# Patient Record
Sex: MALE | Race: WHITE
[De-identification: names, ages, dates, MRNs, and addresses within clinical notes are randomized per-mention and may not be internally consistent; named-entity substitution may affect disease eponyms.]

---

## 2017-11-14 ENCOUNTER — HOSPITAL ENCOUNTER (OUTPATIENT)
Dept: HOSPITAL 80 - FIMAGING | Age: 77
End: 2017-11-14
Attending: GENERAL ACUTE CARE HOSPITAL
Payer: COMMERCIAL

## 2017-11-14 DIAGNOSIS — M79.605: Primary | ICD-10-CM

## 2017-11-14 DIAGNOSIS — Z96.652: ICD-10-CM

## 2017-11-14 DIAGNOSIS — M79.89: ICD-10-CM

## 2018-04-24 ENCOUNTER — HOSPITAL ENCOUNTER (OUTPATIENT)
Dept: HOSPITAL 80 - FIMAGING | Age: 78
End: 2018-04-24
Attending: FAMILY MEDICINE
Payer: COMMERCIAL

## 2018-04-24 DIAGNOSIS — M81.0: ICD-10-CM

## 2018-04-24 DIAGNOSIS — Z13.820: Primary | ICD-10-CM

## 2018-06-07 ENCOUNTER — HOSPITAL ENCOUNTER (OUTPATIENT)
Dept: HOSPITAL 80 - FIMAGING | Age: 78
End: 2018-06-07
Attending: FAMILY MEDICINE
Payer: COMMERCIAL

## 2018-06-07 DIAGNOSIS — M51.26: ICD-10-CM

## 2018-06-07 DIAGNOSIS — M48.061: Primary | ICD-10-CM

## 2018-10-02 ENCOUNTER — HOSPITAL ENCOUNTER (OUTPATIENT)
Dept: HOSPITAL 80 - FCATH | Age: 78
Discharge: HOME | End: 2018-10-02
Attending: INTERNAL MEDICINE
Payer: COMMERCIAL

## 2018-10-02 DIAGNOSIS — I77.810: ICD-10-CM

## 2018-10-02 DIAGNOSIS — Z96.652: ICD-10-CM

## 2018-10-02 DIAGNOSIS — Z79.01: ICD-10-CM

## 2018-10-02 DIAGNOSIS — I44.2: ICD-10-CM

## 2018-10-02 DIAGNOSIS — G47.33: ICD-10-CM

## 2018-10-02 DIAGNOSIS — I25.10: Primary | ICD-10-CM

## 2018-10-02 DIAGNOSIS — R60.0: ICD-10-CM

## 2018-10-02 DIAGNOSIS — E78.00: ICD-10-CM

## 2018-10-02 DIAGNOSIS — E66.01: ICD-10-CM

## 2018-10-02 DIAGNOSIS — Z95.5: ICD-10-CM

## 2018-10-02 DIAGNOSIS — I10: ICD-10-CM

## 2018-10-02 DIAGNOSIS — I48.0: ICD-10-CM

## 2018-10-02 DIAGNOSIS — Z95.0: ICD-10-CM

## 2018-10-02 LAB
INR PPP: 1.03 (ref 0.83–1.16)
PLATELET # BLD: 194 10^3/UL (ref 150–400)
PROTHROMBIN TIME: 13.7 SEC (ref 12–15)

## 2018-10-02 PROCEDURE — C1887 CATHETER, GUIDING: HCPCS

## 2018-10-02 PROCEDURE — 93571 IV DOP VEL&/PRESS C FLO 1ST: CPT

## 2018-10-02 PROCEDURE — 93005 ELECTROCARDIOGRAM TRACING: CPT

## 2018-10-02 PROCEDURE — 4A023N7 MEASUREMENT OF CARDIAC SAMPLING AND PRESSURE, LEFT HEART, PERCUTANEOUS APPROACH: ICD-10-PCS | Performed by: INTERNAL MEDICINE

## 2018-10-02 PROCEDURE — B2151ZZ FLUOROSCOPY OF LEFT HEART USING LOW OSMOLAR CONTRAST: ICD-10-PCS | Performed by: INTERNAL MEDICINE

## 2018-10-02 PROCEDURE — 93458 L HRT ARTERY/VENTRICLE ANGIO: CPT

## 2018-10-02 PROCEDURE — C1769 GUIDE WIRE: HCPCS

## 2018-10-02 PROCEDURE — B2111ZZ FLUOROSCOPY OF MULTIPLE CORONARY ARTERIES USING LOW OSMOLAR CONTRAST: ICD-10-PCS | Performed by: INTERNAL MEDICINE

## 2018-10-02 PROCEDURE — 4A033BC MEASUREMENT OF ARTERIAL PRESSURE, CORONARY, PERCUTANEOUS APPROACH: ICD-10-PCS | Performed by: INTERNAL MEDICINE

## 2018-10-02 NOTE — CPEKG
Test Reason : OPEN

Blood Pressure : ***/*** mmHG

Vent. Rate : 075 BPM     Atrial Rate : 152 BPM

   P-R Int : 086 ms          QRS Dur : 133 ms

    QT Int : 586 ms       P-R-T Axes : 076 086 -83 degrees

   QTc Int : 655 ms

 

Afib/flut and V-paced complexes

PVCs

 

Confirmed by Emelina Saenz ([id number]) on 10/2/2018 4:59:45 PM

 

Referred By:             Confirmed By:Emelina Saenz

## 2018-10-02 NOTE — CPIP
DATE OF PROCEDURE:  10/02/2018



PROCEDURE:  

1.  Coronary angiography.

2.  Fractional flow reserve of the circumflex coronary artery.



INDICATION:  

1.  Abnormal stress test.

2.  Intermediate grade lesion involving the proximal circumflex coronary artery.



ACCESS AND CORONARY ANGIOGRAPHY:  Access and coronary angiography were performed by Dr. Enrique Alberto.
  Coronary angiography was notable for an intermediate grade lesion involving the proximal circumflex
 coronary artery.  I was consulted to perform FFR on this lesion to determine clinical significance.



FFR OF THE CIRCUMFLEX CORONARY ARTERY:  A 6-Lao EBU 4.0 catheter was advanced to the left main cor
onary artery and images obtained.  Angiography confirmed the presence of an intermediate grade lesion
 involving the proximal circumflex coronary artery.  The FFR wire was placed in the distal vessel and
 position verified by angiography.  IV adenosine was infused for a total of 2 minutes and 30 seconds 
and FFR was obtained.  The FFR was 0.97 indicating no flow limitation.  Given limited variability fro
m baseline FFR, it was decided to perform IC adenosine infusion as well to see if the FFR would lama
e.  The FFR following IV adenosine infusion was 0.97 as well indicating no flow limitation.



COMPLICATIONS:  None.



CONCLUSION:  Intermediate grade lesion involving the proximal circumflex coronary artery with no evid
ence of flow limitation by fractional flow reserve.



PLAN:  For medical management.





Job #:  [id number]

## 2018-10-03 ENCOUNTER — HOSPITAL ENCOUNTER (OUTPATIENT)
Dept: HOSPITAL 80 - F3N | Age: 78
Setting detail: OBSERVATION
LOS: 1 days | Discharge: HOME HEALTH | End: 2018-10-04
Attending: NEUROLOGICAL SURGERY | Admitting: NEUROLOGICAL SURGERY
Payer: COMMERCIAL

## 2018-10-03 DIAGNOSIS — E78.00: ICD-10-CM

## 2018-10-03 DIAGNOSIS — Z79.01: ICD-10-CM

## 2018-10-03 DIAGNOSIS — G96.19: ICD-10-CM

## 2018-10-03 DIAGNOSIS — M48.061: Primary | ICD-10-CM

## 2018-10-03 DIAGNOSIS — Z95.0: ICD-10-CM

## 2018-10-03 DIAGNOSIS — I10: ICD-10-CM

## 2018-10-03 DIAGNOSIS — Z96.652: ICD-10-CM

## 2018-10-03 DIAGNOSIS — Z95.5: ICD-10-CM

## 2018-10-03 DIAGNOSIS — M51.36: ICD-10-CM

## 2018-10-03 DIAGNOSIS — M46.1: ICD-10-CM

## 2018-10-03 DIAGNOSIS — I25.10: ICD-10-CM

## 2018-10-03 PROCEDURE — 00BY0ZZ EXCISION OF LUMBAR SPINAL CORD, OPEN APPROACH: ICD-10-PCS | Performed by: NEUROLOGICAL SURGERY

## 2018-10-03 PROCEDURE — 63047 LAM FACETEC & FORAMOT LUMBAR: CPT

## 2018-10-03 PROCEDURE — 63267 EXCISE INTRSPINL LESION LMBR: CPT

## 2018-10-03 PROCEDURE — 4A11X4G MONITORING OF PERIPHERAL NERVOUS ELECTRICAL ACTIVITY, INTRAOPERATIVE, EXTERNAL APPROACH: ICD-10-PCS | Performed by: NEUROLOGICAL SURGERY

## 2018-10-03 PROCEDURE — 00NY0ZZ RELEASE LUMBAR SPINAL CORD, OPEN APPROACH: ICD-10-PCS | Performed by: NEUROLOGICAL SURGERY

## 2018-10-03 PROCEDURE — 97166 OT EVAL MOD COMPLEX 45 MIN: CPT

## 2018-10-03 PROCEDURE — 76001: CPT

## 2018-10-03 PROCEDURE — G0378 HOSPITAL OBSERVATION PER HR: HCPCS

## 2018-10-03 PROCEDURE — 97535 SELF CARE MNGMENT TRAINING: CPT

## 2018-10-03 PROCEDURE — 97161 PT EVAL LOW COMPLEX 20 MIN: CPT

## 2018-10-03 RX ADMIN — METHOCARBAMOL PRN MG: 750 TABLET ORAL at 15:29

## 2018-10-03 RX ADMIN — GABAPENTIN SCH MG: 300 CAPSULE ORAL at 22:48

## 2018-10-03 RX ADMIN — DOCUSATE SODIUM AND SENNOSIDES SCH: 50; 8.6 TABLET ORAL at 22:49

## 2018-10-03 RX ADMIN — SOTALOL HYDROCHLORIDE SCH MG: 80 TABLET ORAL at 22:48

## 2018-10-03 RX ADMIN — FUROSEMIDE SCH: 20 TABLET ORAL at 12:48

## 2018-10-03 RX ADMIN — DOCUSATE SODIUM AND SENNOSIDES SCH TAB: 50; 8.6 TABLET ORAL at 23:02

## 2018-10-03 RX ADMIN — FUROSEMIDE SCH MG: 20 TABLET ORAL at 15:03

## 2018-10-03 RX ADMIN — ACETAMINOPHEN SCH MG: 500 TABLET ORAL at 22:49

## 2018-10-03 RX ADMIN — Medication SCH: at 12:47

## 2018-10-03 RX ADMIN — DOCUSATE SODIUM AND SENNOSIDES SCH: 50; 8.6 TABLET ORAL at 12:48

## 2018-10-03 RX ADMIN — FAMOTIDINE SCH MG: 20 TABLET, FILM COATED ORAL at 22:50

## 2018-10-03 RX ADMIN — GABAPENTIN SCH MG: 300 CAPSULE ORAL at 15:02

## 2018-10-03 RX ADMIN — Medication SCH MLS: at 22:52

## 2018-10-03 RX ADMIN — FAMOTIDINE SCH: 20 TABLET, FILM COATED ORAL at 12:48

## 2018-10-03 RX ADMIN — FINASTERIDE SCH: 5 TABLET, FILM COATED ORAL at 12:48

## 2018-10-03 RX ADMIN — ACETAMINOPHEN SCH MG: 500 TABLET ORAL at 15:03

## 2018-10-03 RX ADMIN — SOTALOL HYDROCHLORIDE SCH: 80 TABLET ORAL at 12:48

## 2018-10-03 RX ADMIN — LISINOPRIL SCH MG: 10 TABLET ORAL at 12:45

## 2018-10-03 RX ADMIN — Medication SCH MLS: at 15:15

## 2018-10-03 NOTE — SOAPPROG
SOAP Progress Note


Assessment/Plan: 


(late entry due to access issue of [employer])





Post Op Visit





S: Awake and alert, NAD.  Pt with expected lower back pain


O: AFVSS/PERRLA/EOMI


no droop


CN 2-12 grossly intact


+lt touch


5/5 BUE/BLE =


CDI





A/P: 77 yo male that is s/p L4/5 laminectomy


-s/p laminectomy-pt with some expected lower back pain, LE feel "ok"


-orders in place


-call with any questions or concerns


-pt understands and agrees


-PT/OT





Pt seen in PACU





10/03/18 11:10





Objective: 





 Vital Signs











Temp Pulse Resp BP Pulse Ox


 


 36.1 C   72   9 L  132/74 H  96 


 


 10/03/18 10:15  10/03/18 10:15  10/03/18 10:40  10/03/18 10:40  10/03/18 10:40














ICD10 Worksheet


Patient Problems: 


 Problems











Problem Status Onset


 


S/P lumbar laminectomy Acute  














- ICD10 Problem Qualifiers


(1) S/P lumbar laminectomy

## 2018-10-03 NOTE — PDANEPAE
ANE History of Present Illness





L4-5 LAMI





ANE Past Medical History





- Cardiovascular History


Hx Hypertension: Yes


Hx Arrhythmias: Yes


Hx Chest Pain: No


Hx Coronary Artery / Peripheral Vascular Disease: Yes


Hx CHF / Valvular Disease: No


Hx Palpitations: No


Cardiovascular History Comment: cad with stent.  afib.  complete heart block 

with pacer.  hyperlipidemia





- Pulmonary History


Hx COPD: No


Hx Asthma/Reactive Airway Disease: No


Hx Recent Upper Respiratory Infection: No


Hx Oxygen in Use at Home: No


Hx Sleep Apnea: Yes


Sleep Apnea Screening Result - Last Documented: Positive


Pulmonary History Comment: mariama positive- doesn't use cpap and hasn't used in 

over a year





- Neurologic History


Hx Cerebrovascular Accident: No


Hx Seizures: No


Hx Dementia: No





- Endocrine History


Hx Diabetes: No





- Renal History


Hx Renal Disorders: No





- Liver History


Hx Hepatic Disorders: No





- Neurological & Psychiatric Hx


Hx Neurological and Psychiatric Disorders: Yes


Neurological / Psychiatric History Comment: anxiety





- Cancer History


Hx Cancer: No





- Congenital Disorder History


Hx Congenital Disorders: No





- GI History


Hx Gastrointestinal Disorders: Yes


Gastrointestinal History Comment: hx of bowel obstruction with resection





- Other Health History


Other Health History: wears glasses.  current eyelid infection treating with abx





- Chronic Pain History


Chronic Pain: Yes (back pain and legs)





- Surgical History


Prior Surgeries: pacemaker placed 04/07/14.  left TKA and revision.  broken 

shoulder repair.  bowel resection from obstruction.  hematoma evacuation to 

left thigh.  broken pelvis repair from fall off ladder





ANE Review of Systems


Review of Systems: 








- Exercise capacity


METS (RN): 3 METS





- Pacemaker


Pacemaker Type: Permanent Pacer/Defib


Pacemaker : [employer]


Pacemaker Model: Advantio


Pacemaker Mode: DDDR


Pacemaker Set Rate: 60


Date Pacemaker Last Checked: 03/19/18





ANE Patient History





- Allergies


Allergies/Adverse Reactions: 








No Known Allergies Allergy (Verified 09/28/18 19:00)


 








- Home Medications


Home Medications: 








Apixaban [Eliquis] 5 mg PO BID 09/24/18 [Last Taken 09/29/18]


Atorvastatin Calcium [Lipitor 40 mg (*)] 40 mg PO HS 09/24/18 [Last Taken 10/02/

18 07:30]


Calcium Carb W/Vit D [Calcium Carb W/Vit D 500/200 (*)] 500 mg PO DAILY 09/24/ 18 [Last Taken 10/01/18 07:00]


Escitalopram Oxalate [Lexapro] 5 mg PO DAILY 09/24/18 [Last Taken 10/02/18 19:30

]


Finasteride [Proscar 5 MG (*)] 5 mg PO DAILY 09/24/18 [Last Taken 10/02/18 07:00

]


Fosinopril Sodium 10 mg PO DAILY 09/24/18 [Last Taken 10/02/18 07:30]


Furosemide [Lasix 20 MG (*)] 20 mg PO BID@09,15 09/24/18 [Last Taken 10/02/18 16

:00]


Herbals/Supplements -Info Only 1 ea PO DAILY 09/24/18 [Last Taken 09/29/18]


Sotalol HCl [Betapace 80 MG (*)] 80 mg PO BID 09/24/18 [Last Taken 10/02/18 07:

30]


oxyCODONE HCL/ACETAMINOPHEN [Percocet  mg Tablet] 1 each PO Q6HRS PRN 09/ 24/18 [Last Taken 10/03/18 04:15]


traMADol [Ultram 50 mg (*)] 50 mg PO Q4 PRN 09/24/18 [Last Taken 10/02/18 21:00]








- NPO status


NPO Since - Liquids (Date): 10/02/18


NPO Since - Liquids (Time): 19:00


NPO Since - Solids (Date): 10/02/18


NPO Since - Solids (Time): 19:00





- Smoking Hx


Smoking Status: Never smoked





- Family Anes Hx


Family Hx Anesthesia Complications: none





ANE Labs/Vital Signs





- Vital Signs


Blood Pressure: 106/54


Heart Rate: 76


Respiratory Rate: 16


O2 Sat (%): 90


Height: 172.7 cm


Weight: 107.3 kg





ANE Physical Exam





- Airway


Neck exam: FROM


Mallampati Score: Class 3


Mouth exam: normal dental/mouth exam





- Pulmonary


Pulmonary: clear to auscultation





- Cardiovascular


Cardiovascular: regular rate and rhythym





- ASA Status


ASA Status: III





ANE Anesthesia Plan


Anesthesia Plan: general endotracheal anesthesia

## 2018-10-03 NOTE — POSTOPPROG
Post Op Note


Date of Operation: 10/03/18


Surgeon: JAMMIE Scott


Assistant: WILEY Rider


Anesthesia: GET(General Endotracheal), Local (Specify)


Pre-op Diagnosis: lumbar stenosis L4/5


Post-op Diagnosis: lumbar stenosis L4/5


Indication: LLE pain/LBP


Procedure: L4/5 laminectomy


Findings: none


Inf/Abcess present in the surg proc area at time of surgery?: No


Depth: Deep Incisional (Fascial)


EBL: 


Complications: 





none

## 2018-10-03 NOTE — GOP
DATE OF OPERATION:  10/03/2018



SURGEON:  JADE Scott MD



NEUROSURGEON:  Reagan Scott M.D.



ASSISTANT:  Kenney Rider PA-C.



PREOPERATIVE DIAGNOSIS:  Severe lumbar stenosis L4-5, probable mass in the spinal canal at L4-5.



POSTOPERATIVE DIAGNOSIS:  

1.  Severe stenosis L4-5 with a left-sided synovial cyst, L4-5.



PROCEDURE PERFORMED:  Bilateral laminectomy of L4-5 with a resection of an intraspinal left-sided syn
ovial cyst ([id number]), microscope.



FINDINGS:  





ESTIMATED BLOOD LOSS:  25 cc.



INDICATIONS:  Mr. Melendez is an elderly gentleman with terrible bilateral leg pain, and a CT myelogr
am demonstrated what appeared to be severe stenosis at L4-5 with a large left-sided intraspinal mass 
at that level.  It was unclear, however, the quality of myelogram was somewhat poor, and I was not re
ally sure if he had a mass there or not, but he did have severe stenosis at L4-5.  There is also sten
osis at L2-3 and L3-4, but I felt the symptoms almost certainly were coming from the single level at 
L4-5.  We offered him a decompression.  He understood it may or may not alleviate the pain.  The risk
 of nerve injury and spinal fluid leak was discussed.  He did want to proceed.



DESCRIPTION OF PROCEDURE:  Patient was taken to the operating room, placed in a supine position.  Gen
eral anesthesia was begun.  He was flipped prone onto the [place] frame.  Care was taken to pad all po
ints of contact.  His back was sterilely prepped and draped in the usual fashion.  A localizing x-ray
 was taken.  We made a midline incision about 2.5 inches in length.  The subcutaneous tissue was diss
ected using Bovie cautery down to the fascia, and subperiosteal dissection was made down the L4-5 de la cruz
paul.  A self-retaining retractor was placed.  It required the deepest retractor system that we had av
ailable in the [place].  We confirmed our location at L4-5, and removed most of the L4 spinous pr
ocess.  We then drilled bilateral laminas at L4, and the rostral lip of L5 was removed.  We opened li
gamentum flavum, and the ligamentum flavum was completely stuck to the dura everywhere outside of the
 midline.  We stripped the ligamentum flavum at the midline, and then, because we could not really de
velop a plane between the ligamentum flavum and the dura, I then went to the left-hand side where I e
xpected to find a mass in the spinal canal, and I simply started opening up the left L4-5 facet and u
ndercutting the IAP of L4 on the left-hand side, and, as it went rostrally toward the L4 pedicle, I e
ncountered a large amount of synovial proteinaceous fluid, and this allowed me to enter the spinal ca
nal, and I was inside a huge synovial cyst.  It was about the size of a human thumb.  It was a giant 
cyst measuring about 2 cm occupying the entire left rimma-spinal canal, and we completely removed the 
contents of the cyst and then began laterally stripping the cyst wall out of the neural foramen down 
toward the L5 pedicle.  The medial cyst wall was stuck to the dura, but we were able to carefully and
 slowly dissect it free from the dura completely, and completely resected the cyst.  We did not send 
it to pathology.  It came out piecemeal and was simply a synovial cyst.  On the right-hand side, the 
dura was completely attached to the ligamentum flavum as well.  We went rostrally up toward the L4 pe
dicle and developed a plane into the foramen to the exiting L4 root, and then began to work our way i
nferiorly, peeling the ligamentum flavum away from the dura, and we were able to do so all the way do
wn to the L5 pedicle.  We got a great right-sided decompression.  We irrigated with antibiotic saline
 solution.  We did not shoot a final x-ray as we already knew we were at the L4-5 level, and then res
ected a large mass in the spinal canal consistent with what the myelogram demonstrated.  We then clos
ed the incision in multiple layers using Vicryl sutures.  A running PDS was placed in the skin itself
.  The patient was reversed from anesthesia, extubated, and transferred to recovery room in stable co
ndition.  There were no complications.



COMPLICATIONS:  None.





Job #:  [id number]

## 2018-10-03 NOTE — PDDXCAT
Diagnostic Cath Note





- .


Date: 10/03/18


: White


Indication: other (Recent admission with chest discomfort, abnormal stress 

myocardial perfusion imaging with intermediate risk features.)





- Procedure


Access: right wrist





- Materials


Left Heart Cath size: 5F


Left Heart Cath materials: JL4.0, JR4.0





- Findings-Left Heart Catheterization


LM: Moderate caliber vessel.  Eccentric distal calcium shelf resulting in a 20% 

distal stenosis.


LAD: Moderate caliber vessel.  Single very proximal diagonal branch with 

multiple smaller surgically insignificant diagonal branches.  Diffusely 

diseased with luminal irregularities up to 20%.  Stents are visualized in the 

proximal segment which are widely patent.


LCX: Moderate caliber in the proximal segment.  Becoming diminutive after the 

origin of a single obtuse marginal branch.  There is a 50% proximal lesion.  

The remainder of the vessel is diffusely diseased with luminal irregularities 

up to 20%.


RCA: Moderate caliber vessel.  Dominant with an obvious PDA and 2 

posterolateral branches.  Diffusely diseased up to 30% in the proximal and mid 

segment.  The distal vessel is difficult to visualize between the 1st and 2nd 

posterolateral branch however there is the appearance of a high-grade lesion in 

this segment.


LVEF: 55-60%.


Wall motion: Abnormal wall motion likely related to the patient's paced rhythm.


Complications: None


Estimated blood loss: <50ml


Closure method: TR Band


Assessment: 1.  Diffuse epicardial coronary disease without stenoses in the 

major vessels.  There is likely a high-grade lesion in the distal portion of 

the right coronary artery between the 1st and 2nd posterolateral branches.  2. 

Previously placed stents in the proximal LAD are widely patent.  3. Preserved 

left ventricular systolic function with abnormal contractility likely related 

to the patient's underlying paced rhythm.  4. Based on the results of this study

, the patient is at low risk for perioperative cardiovascular events in the 

setting of the planned back surgery.


Plan: 





Maximal medical therapy for secondary prevention.


Intervention: 


There were no interventions performed.  The patient did have fractional flow 

reserve testing of the circumflex which resulted in an FFR of 1 indicating 

nonobstructive disease in this vessel.

## 2018-10-04 VITALS — DIASTOLIC BLOOD PRESSURE: 64 MMHG | SYSTOLIC BLOOD PRESSURE: 114 MMHG

## 2018-10-04 RX ADMIN — GABAPENTIN SCH MG: 300 CAPSULE ORAL at 06:28

## 2018-10-04 RX ADMIN — FUROSEMIDE SCH MG: 20 TABLET ORAL at 08:48

## 2018-10-04 RX ADMIN — METHOCARBAMOL PRN MG: 750 TABLET ORAL at 03:14

## 2018-10-04 RX ADMIN — FINASTERIDE SCH MG: 5 TABLET, FILM COATED ORAL at 08:48

## 2018-10-04 RX ADMIN — SOTALOL HYDROCHLORIDE SCH MG: 80 TABLET ORAL at 08:47

## 2018-10-04 RX ADMIN — GABAPENTIN SCH MG: 300 CAPSULE ORAL at 14:19

## 2018-10-04 RX ADMIN — FAMOTIDINE SCH MG: 20 TABLET, FILM COATED ORAL at 08:47

## 2018-10-04 RX ADMIN — Medication SCH MG: at 08:49

## 2018-10-04 RX ADMIN — DOCUSATE SODIUM AND SENNOSIDES SCH TAB: 50; 8.6 TABLET ORAL at 08:46

## 2018-10-04 RX ADMIN — METHOCARBAMOL PRN MG: 750 TABLET ORAL at 07:30

## 2018-10-04 RX ADMIN — ACETAMINOPHEN SCH MG: 500 TABLET ORAL at 14:19

## 2018-10-04 RX ADMIN — LISINOPRIL SCH MG: 10 TABLET ORAL at 08:48

## 2018-10-04 RX ADMIN — ACETAMINOPHEN SCH: 500 TABLET ORAL at 06:37

## 2018-10-04 NOTE — PDIAF
- Diagnosis


Diagnosis: S/P L4-5 laminectomy 


Code Status: Full Code





- Medication Management


Discharge Medications: 


 Medications to Continue on Transfer





Atorvastatin Calcium [Lipitor 40 mg (*)] 40 mg PO HS 09/24/18 [Last Taken 10/02/

18 07:30]


Calcium Carb W/Vit D [Calcium Carb W/Vit D 500/200 (*)] 500 mg PO DAILY 09/24/ 18 [Last Taken 10/01/18 07:00]


Escitalopram Oxalate [Lexapro 10 MG] 5 mg PO DAILY 09/24/18 [Last Taken 10/02/

18 19:30]


Finasteride [Proscar 5 MG (*)] 5 mg PO DAILY 09/24/18 [Last Taken 10/02/18 07:00

]


Fosinopril Sodium 10 mg PO DAILY 09/24/18 [Last Taken 10/02/18 07:30]


Furosemide [Lasix 20 MG (*)] 20 mg PO BID@09,15 09/24/18 [Last Taken 10/02/18 16

:00]


Herbals/Supplements -Info Only 1 ea PO DAILY 09/24/18 [Last Taken 09/29/18]


Sotalol HCl [Betapace 80 MG (*)] 80 mg PO BID 09/24/18 [Last Taken 10/02/18 07:

30]


traMADol [Ultram 50 mg (*)] 50 mg PO Q4 PRN 09/24/18 [Last Taken 10/02/18 21:00]


Apixaban [Eliquis] 5 mg PO BID #1 10/04/18 [Last Taken 09/29/18]


Methocarbamol [Robaxin 750 mg (*)] 750 mg PO QID PRN #40 tab 10/04/18 [Last 

Taken Unknown]


oxyCODONE IR [Oxycodone Ir (*)] 5 - 10 mg PO Q4HRS PRN #50 tab 10/04/18 [Last 

Taken Unknown]








Discharge Medications: Refer to the Discharge Home Medication list for PRN 

reason.


PICC Care - Routine: N/A





- Orders


Services needed: Home Care, Physical Therapy, Occupational Therapy


Home Care Face to Face: I certify that this patient was under my care and that 

I had the required face-to-face encounter meeting the encounter requirements on 

the discharge day.  My findings support the fact that the patient is homebound 

as defined in


Home Care Face to Face Continued: CMS Chapter 7 Medicare Benefits Manual 30.1.1

, The condition of the patient is such that there exists a normal inability to 

leave home and consequently, leaving home would require a considerable and 

taxing effort.


Diet Recommendation: no restrictions on diet


Diet Texture: Regular Texture Diet


Sutures/Staples Site: Ok to remove dressing on Saturday 10/6.  Leave steri 

strips in place.  Ok to shower Saturday 10/6


Activity/Weight Bearing Restrictions: Do not lift greater than 10 pounds.  No 

bending or twisting at the waist


Additional Instructions: 


No bending or twisting


Do not lift greater than 10 pounds


Ok to remove dressing on Saturday 10/6-leave steri strips in place


Ok to shower on Saturday 10/6


Do not submerge incision 





- Follow Up Care


Current Providers and Referrals: 


Yury Mas MD [Primary Care Provider] - 


JAMMIE Scott MD [Medical Doctor] - follow up in 2 weeks

## 2018-10-04 NOTE — ASMTCMCOM
CM Note

 

CM Note                       

Notes:

Pt s/p planned surgery for spinal stenosis. Pt with comorbidities including HTN, CAD. Pt resides 

with spouse. PT/OT evals pending. CM to follow. 

 

Date Signed:  10/04/2018 10:43 AM

Electronically Signed By:JAISON Lee

## 2018-10-04 NOTE — ASMTDCNOTE
Case Management Discharge

 

Discharge Order Complete?     Answers:  Yes                                   

Patient to Obtain             Answers:  via Family                            

Medications                                                                   

Transportation Arranged       Answers:  Family/Friends                        

Agency/Facility Transfer      Answers:  Yes                                   

Report Printed & Faxed to                                                     

Receiving Agency                                                              

Family Notified               Answers:  Yes                           Notes:  wife in room

Discharge Comments            

Notes:

Spoke with pt and wife in the room. PT/OT recommending PT/OT homecare. Pt and wife agreeable. [facility] 

accepted and will start tomorrow. No further CM needs noted at this time. 

 

Date Signed:  10/04/2018 04:02 PM

Electronically Signed By:Yanique Pham

## 2018-10-04 NOTE — NEUSURGPN
Date of Surgery: 10/03/18


Post Op Day: 1


Assessment/Plan: 


Assessment:  79 yo male that is s/p L4/5 laminectomy POD#1





Plan:


-patient reports improvement in pre operative leg pain, expected incisional 

pain 


-discharge home today 


-PT/OT


Patient discussed with Dr Scott


Please call with questions/concerns 





Subjective: 


leg pain improved, sitting in chair 


Objective: 


AxO x3


COREAS x4


5/5 BLE


Sensation intact light touch BLE


Dressing/incision CDI 


Neuro Check Frequency: per routine 


Urinary Catheter in Place: No





- Physician


Discussed Patient with : Tyler


Patient Seen by : Tyler





Neurosurgery Physical Exam





- Vitals, I&O, Labs





 I and O











 10/03/18 10/04/18 10/05/18





 05:59 05:59 05:59


 


Intake Total  1000 


 


Output Total  1100 550


 


Balance  -100 -550


 


Weight  107.3 kg 


 


Intake:   


 


  Oral (ml)  1000 


 


Output:   


 


  Urine (ml)  1100 550


 


    Urinal  1100 550


 


Other:   


 


  Number of Voids   


 


    Incontinence  1 


 


    Urinal  1 1








 Vital Signs











Temp Pulse Resp BP Pulse Ox


 


 36.8 C   68   16   114/64   94 


 


 10/04/18 11:20  10/04/18 11:20  10/04/18 11:20  10/04/18 11:20  10/04/18 11:20














ICD10 Worksheet


Patient Problems: 


 Problems











Problem Status Onset


 


S/P lumbar laminectomy Acute

## 2018-10-04 NOTE — ASDISCHSUM
----------------------------------------------

Discharge Information

----------------------------------------------

Plan Status:Home with Home Health                    Medically Cleared to Leave:10/04/2018

Discharge Date:10/04/2018 02:47 PM                    D/C Disposition:Home Health Service

[facility] D/C Disposition:HHSNOTBCH                        Projected Discharge Date:10/04/2018 11:00 AM

Transportation at D/C:Family                         Discharge Delay Reason:

Follow-Up Date:10/04/2018 11:00 AM                   Discharge Slot:

Final Diagnosis:laminectomy

----------------------------------------------

Placement Information

----------------------------------------------

Referral Type:*Home Health Care Services             Referral ID:[id number]

Provider Name:[facility]

Address 1:[address] [facility] [address]                  Phone [phone]

Address 2:                                           Fax [phone]

City:[place]                                         Selection Factors:

State:[place]

 

----------------------------------------------

Patient Contact Information

----------------------------------------------

Contact Name:GOMEZ                           Relationship:Wife

Address:[address]                              Home [phone]

                                                     Work Phone:

[facility]:[place]                                        Alternate Phone:

[facility]/Zip Code:[place] [address]                              Email:

----------------------------------------------

Financial Information

----------------------------------------------

Financial Class:Medicare Advantage Plans

Primary Plan Desc:[employer]         Primary Plan Number:[id number]

Secondary Plan Desc:                                 Secondary Plan Number:

 

 

----------------------------------------------

Assessment Information

----------------------------------------------

----------------------------------------------

LACE

----------------------------------------------

LACE

 

Length of stay for            Answers:  1 day                                 

current admission                                                             

Acuity / Level of             Answers:  No                                    

Care: Did the patient                                                         

have an inpatient                                                             

admission?                                                                    

Comorbidities - select        Answers:  Coronary Artery Disease               

all that apply                                                                

                                        Opioid dependence                     

                                        / Chronic pain                        

                                        Other                         Notes:  HTN

# of Emergency department     Answers:  0                                     

visits in the last 6                                                          

months                                                                        

Social determinants           Answers:  Mental health diagnosis               

                                        (anxiety, depression, pers            

                                        onality disorders, etc.)              

Score: 11

 

Date Signed:  10/04/2018 04:01 PM

Electronically Signed By:Yanique Pham

 

 

----------------------------------------------

[facility] CM Progress Note

----------------------------------------------

CM Note

 

CM Note                       

Notes:

Pt s/p planned surgery for spinal stenosis. Pt with comorbidities including HTN, CAD. Pt resides 

with spouse. PT/OT monet pending. CM to follow. 

 

Date Signed:  10/04/2018 10:43 AM

Electronically Signed By:JAISON Lee

 

 

----------------------------------------------

Case Management Discharge Plan Note

----------------------------------------------

Case Management Discharge

 

Discharge Order Complete?     Answers:  Yes                                   

Patient to Obtain             Answers:  via Family                            

Medications                                                                   

Transportation Arranged       Answers:  Family/Friends                        

Agency/Facility Transfer      Answers:  Yes                                   

Report Printed & Faxed to                                                     

Receiving Agency                                                              

Family Notified               Answers:  Yes                           Notes:  wife in room

Discharge Comments            

Notes:

Spoke with pt and wife in the room. PT/OT recommending PT/OT homecare. Pt and wife agreeable. [facility] 

accepted and will start tomorrow. No further CM needs noted at this time. 

 

Date Signed:  10/04/2018 04:02 PM

Electronically Signed By:Yanique Pham

 

 

----------------------------------------------

Intervention Information

----------------------------------------------

## 2018-10-04 NOTE — ASMTLACE
LACE

 

Length of stay for            Answers:  1 day                                 

current admission                                                             

Acuity / Level of             Answers:  No                                    

Care: Did the patient                                                         

have an inpatient                                                             

admission?                                                                    

Comorbidities - select        Answers:  Coronary Artery Disease               

all that apply                                                                

                                        Opioid dependence                     

                                        / Chronic pain                        

                                        Other                         Notes:  HTN

# of Emergency department     Answers:  0                                     

visits in the last 6                                                          

months                                                                        

Social determinants           Answers:  Mental health diagnosis               

                                        (anxiety, depression, pers            

                                        onality disorders, etc.)              

Score: 11

 

Date Signed:  10/04/2018 04:01 PM

Electronically Signed By:Yanique Pham

## 2018-10-05 ENCOUNTER — HOSPITAL ENCOUNTER (INPATIENT)
Dept: HOSPITAL 80 - FED | Age: 78
LOS: 3 days | Discharge: NURSING, REHAB OR LONG-TERM CARE | DRG: 552 | End: 2018-10-08
Attending: NEUROLOGICAL SURGERY | Admitting: NEUROLOGICAL SURGERY
Payer: COMMERCIAL

## 2018-10-05 DIAGNOSIS — I50.9: ICD-10-CM

## 2018-10-05 DIAGNOSIS — M54.9: Primary | ICD-10-CM

## 2018-10-05 DIAGNOSIS — Z95.5: ICD-10-CM

## 2018-10-05 DIAGNOSIS — Z79.01: ICD-10-CM

## 2018-10-05 DIAGNOSIS — Z95.1: ICD-10-CM

## 2018-10-05 DIAGNOSIS — Z96.659: ICD-10-CM

## 2018-10-05 DIAGNOSIS — R41.0: ICD-10-CM

## 2018-10-05 DIAGNOSIS — E78.5: ICD-10-CM

## 2018-10-05 DIAGNOSIS — I25.10: ICD-10-CM

## 2018-10-05 DIAGNOSIS — I48.91: ICD-10-CM

## 2018-10-05 DIAGNOSIS — I11.0: ICD-10-CM

## 2018-10-05 DIAGNOSIS — R53.1: ICD-10-CM

## 2018-10-05 LAB
INR PPP: 1.02 (ref 0.83–1.16)
PLATELET # BLD: 190 10^3/UL (ref 150–400)
PROTHROMBIN TIME: 13.6 SEC (ref 12–15)

## 2018-10-05 RX ADMIN — FAMOTIDINE SCH MG: 20 TABLET, FILM COATED ORAL at 20:50

## 2018-10-05 RX ADMIN — POTASSIUM CHLORIDE SCH MEQ: 1500 TABLET, EXTENDED RELEASE ORAL at 20:48

## 2018-10-05 RX ADMIN — DOCUSATE SODIUM AND SENNOSIDES SCH TAB: 50; 8.6 TABLET ORAL at 20:50

## 2018-10-05 RX ADMIN — SOTALOL HYDROCHLORIDE SCH MG: 80 TABLET ORAL at 20:49

## 2018-10-05 RX ADMIN — ACETAMINOPHEN SCH MG: 500 TABLET ORAL at 20:50

## 2018-10-05 RX ADMIN — ATORVASTATIN CALCIUM SCH MG: 40 TABLET, FILM COATED ORAL at 20:49

## 2018-10-05 RX ADMIN — APIXABAN SCH MG: 5 TABLET, FILM COATED ORAL at 20:48

## 2018-10-05 NOTE — PDGENHP
History and Physical


History and Physical: 





HOSPITAL MEDICINE CONSULT NOTE





CC:  We are asked by the Neurosurgery service to assess and assist in the care 

of this patient who has some change in mentation and weakness after recent 

spine surgery





HISTORY:  This patient had a surgery here on October 3rd with Dr. Reagan Scott 

for spinal stenosis with neuropathic symptoms and was discharged on October 4th.  New parent he was doing well the discharge in was ambulating with a 

walker and cane.  There had been no signs of complications.  On my review of 

the chart he had no temperatures higher than 37.4, did not have significant 

hypoxia and did not have any concerning vital sign changes.  Today the patient 

was working with physical therapist and was having inability to stand.  On 

attempting a transfer he got and with squatting position from which he could 

not get up with assistance.  There is a report from the patient and from family 

that the patient had some confusion although the patient right now seems fairly 

oriented and he is unable to describe to me any specific points of confusion.  

He states that he had trouble connecting with his medicines but was unable to 

really explain to me what he meant by that.





His home among others included Valium 2.5 mg, oxycodone at low-dose, and 

tramadol


There were not other sedating medicines that I could see on the list which is 

reconciled by our pharmacist here tonight





ROS:  A comprehensive 10 system review revealed no other significant findings








PAST MEDICAL HISTORY:


Spinal stenosis


Coronary disease with stents


Bypass surgery


AFib with chronic anticoagulation


Sick sinus syndrome with pacer


Sleep apnea


Obesity


Chronic right-sided congestive heart failure


4.1 cm ascending aorta


Hypertension


Lipidemia


Small-bowel obstruction


Elevated PSA


Osteoporosis


Total knee arthroplasty


An elbow fracture requiring repair





FAMILY MEDICAL HISTORY:


No relevant concerning findings





SOCIAL HISTORY:


 lives with his wife


They live in a private home


No tobacco or alcohol





MEDICATIONS:


The patients list has been reconciled by our clinical pharmacist in the EMR.  I 

have reviewed the list and ordered appropriate medicines.





PHYSICAL EXAMINATION:


Vital Signs:  All normal without fever


Cardiac Monitor:  Sinus in the ER





Examination:


General: alert, relaxed


Neurologic:  Has good attention span makes good eye contact.  He slightly the 

slow to respond to my questions but answers them appropriately.  Oriented to 

person day location why he is here.  Is able to tell me that he had surgery 

with Dr. Reagan Scott on Tuesday which is correct.  normal speech/language, 

normal CNs, no focal weakness


Skin: warm, dry, good color, no rash


HEENT: normal


Neck: no mass or jvd


Resps: relaxed


Lungs:  Very diminished but clear breath sounds


Heart: regular, no murmur


Abdomen: soft, nondistended, nontender, +BS, no mass


Upper Extremities: normal


Lower Extremities:  By pedal pitting edema appears to be at his baseline per 

the patient, warm


No Bleeding or bruising





IV site: looks normal





LABORATORY DATA:


Unremarkable CBC


On chemistry sodium is 134 otherwise unremarkable





RADIOLOGY STUDIES:


I reviewed x-ray done in the ER today which shows enlarged cardiac silhouette, 

some evidence of atelectasis, no definite acute abnormalities





12 LEAD EKG:


I reviewed the EKG from the ER today which is a sinus rhythm with ventricular 

pacing





ASSESSMENT:


* acute decline in generalized strength leading to inability to stand and 

ambulate at home 3 days after a lumbar spine surgery


* mild decline in cognition acutely


* currently no evidence of infection, dehydration, internal organ dysfunction, 

significant electrolyte abnormalities, hypoxemia


* right-sided congestive heart failure, chronic and appears likely at his 

baseline but does have some significant edema


* left heart disease with coronary stents, no evidence of left-sided failure or 

ischemia at present


* history of AFib currently sinus with paced ventricle; on chronic anticoagulant





The cause of his decline today is unclear.  He does have medications which 

could produce these issues but overall the medication doses are low considering 

his surgery.  Nonetheless given his age and his comorbidities he may be very 

intolerant of pain medicines and benzodiazepines.





PLANS:


* Patient is admitted onto the neurosurgery service, we will follow along 

closely


* Minimize sedating medicines are medicines with CNS side effects


* I have discontinued order for Benadryl at this time as it can worsen symptoms 

such as he has presented


* Will follow closely for any signs of fever or infection or other acute 

metabolic or organ dysfunction issues


* Will continue his usual cardiac medicines including his usual dose of 

diuretic and follow closely for any signs of decompensation








I have reviewed the patient's case in detail with Jonathan Byrd of Neurosurgery


I have reviewed the patient's past medical records as part of this assessment, 

including outpatient cardiology clinic records, primary care records, and 

hospital records here

## 2018-10-05 NOTE — EDPHY
H & P


Stated Complaint: Back pain, lethargy, confusion, decreased mobility


Time Seen by Provider: 10/05/18 15:47


HPI/ROS: 





HPI





CHIEF COMPLAINT:  Increasing confusion, increasing lethargy, decreasing 

mobility recent back surgery.





HISTORY OF PRESENT ILLNESS:  Very pleasant 78-year-old male with multiple 

chronic medical problems including hypertension, hyperlipidemia, morbid obesity

, recent back surgery with laminectomy by Dr. Scott.  Patient presents to the 

emergency room with increasing confusion, decreased mobility with increasing 

weakness.  He has been taking OxyContin for pain control.  Additionally 

Robaxin.  No fever.  Patient is unable to ambulate at home so wife called 911 

he needed great assistance to get to the emergency room.  Upon arrival to the 

emergency room the patient denies any complaints but does appear very weak.  

Globally.  Unable to lift himself up out of bed.  Denies any chest pain or 

shortness of breath.





Past Medical History:  Hypertension, hyperlipidemia, morbid obesity, sciatica, 

back pain





Past Surgical History:  Recent laminectomy.  L4-L5 laminectomy on October 3rd.


 





Family History:  Noncontributory








ROS   


REVIEW OF SYSTEMS:


10 Systems were reviewed and negative with the exception of the elements 

mentioned in the history of present illness.








Exam   


Constitutional nontoxic no acute distress,  triage nursing summary reviewed, 

vital signs reviewed, awake/alert.  Of note upon arrival patient's oxygen 

saturation 86% on room air.


Eyes   normal conjunctivae and sclera, EOMI, PERRLA. 


HENT   normal inspection, atraumatic, moist mucus membranes, no epistaxis, neck 

supple/ no meningismus, no raccoon eyes. 


Respiratory   clear to auscultation bilaterally, normal breath sounds, no 

respiratory distress, no wheezing. 


Cardiovascular   rate normal, regular rhythm, no murmur, no edema, distal 

pulses normal. 


Gastrointestinal   soft, non-tender, no rebound, no guarding, normal bowel 

sounds, no distension, no pulsatile mass. 


Genitourinary   no CVA tenderness. 


Musculoskeletal  no midline vertebral tenderness, full range of motion, no calf 

swelling, no tenderness of extremities, no meningismus, good pulses, 

neurovascularly intact.


Skin   pink, warm, & dry, no rash, skin atraumatic. 


Neurologic globally weak,  awake, alert and oriented x 3, AAOx3, moves all 4 

extremities equally, motor intact, sensory intact, CN II-XII intact, normal 

cerebellar, normal vision, normal speech. 


Psychiatric   normal mood/affect. 


Heme/Lymph/Immune   no lymphadenopathy.





Differential Diagnosis:  Includes but is not limited to in a particular order 

dehydration, electrolyte disturbance, infection, pneumonia, UTI, medication 

side effect, generalized weakness.





Medical Decision Making:  Plan for this patient blood work, IV establishment, 

chest x-ray, EKG comma urine, basic blood work, and re-evaluate.





Re-evaluation:








EKG interpretation by me on record in [employer] system.  Impression time of 

EKG 1617, the this is a paced rhythm.  Ventricularly paced.  Otherwise 

unremarkable.





1631:  Patient's blood work reviewed.  No evidence of electrolyte abnormality.  

No evidence of high white count.





Chest x-ray reviewed shows cardiomegaly without failure or pneumonia.





Given the patient's inability to walk, weakness recent back surgery will 

consult Neurosurgery for hospital admission.








1639: spoke with Dr. Amador, will consult on the patient in the emergency room.





1704:  Seen evaluated by Neurosurgery they will plan on admit for generalized 

weakness.  No acute neurosurgical emergency at this time.


Source: Patient, EMS





- Personal History


Current Tetanus/Diphtheria Vaccine: Yes


Current Tetanus Diphtheria and Acellular Pertussis (TDAP): Yes





- Medical/Surgical History


Hx Asthma: No


Hx Chronic Respiratory Disease: No


Hx Diabetes: No


Hx Cardiac Disease: Yes


Hx Renal Disease: No


Hx Cirrhosis: No


Hx Alcoholism: No


Hx HIV/AIDS: No


Hx Splenectomy or Spleen Trauma: No


Other PMH: HTN, CHF, peripheral edema (+2), sleep apnea, l total knee 

replacement, pacer/defib





- Social History


Smoking Status: Never smoked


Constitutional: 


 Initial Vital Signs











Temperature (C)  37.4 C   10/05/18 15:34


 


Heart Rate  70   10/05/18 15:34


 


Respiratory Rate  16   10/05/18 15:34


 


Blood Pressure  127/86 H  10/05/18 15:34


 


O2 Sat (%)  86 L  10/05/18 15:34








 











O2 Delivery Mode               Nasal Cannula


 


O2 (L/minute)                  2














Allergies/Adverse Reactions: 


 





No Known Allergies Allergy (Verified 09/28/18 19:00)


 








Home Medications: 














 Medication  Instructions  Recorded


 


Atorvastatin Calcium [Lipitor 40 40 mg PO HS 09/24/18





mg (*)]  


 


Calcium Carb W/Vit D [Calcium Carb 500 mg PO DAILY 09/24/18





W/Vit D 500/200 (*)]  


 


Escitalopram Oxalate [Lexapro 10 5 mg PO DAILY 09/24/18





MG]  


 


Finasteride [Proscar 5 MG (*)] 5 mg PO DAILY 09/24/18


 


Fosinopril Sodium 10 mg PO DAILY 09/24/18


 


Furosemide [Lasix 20 MG (*)] 20 mg PO BID@09,15 09/24/18


 


Herbals/Supplements -Info Only 1 ea PO DAILY 09/24/18


 


Sotalol HCl [Betapace 80 MG (*)] 80 mg PO BID 09/24/18


 


traMADol [Ultram 50 mg (*)] 50 mg PO Q4 PRN 09/24/18


 


Apixaban [Eliquis] 5 mg PO BID #1 10/04/18


 


Methocarbamol [Robaxin 750 mg (*)] 750 mg PO QID PRN #40 tab 10/04/18


 


oxyCODONE IR [Oxycodone Ir (*)] 5 - 10 mg PO Q4HRS PRN #50 tab 10/04/18














Medical Decision Making





- Diagnostics


Imaging Results: 


 Imaging Impressions





Chest X-Ray  10/05/18 15:48


Impression:


1. Mild cardiomegaly.


2. Increased markings at the lung bases could be related to subsegmental 

atelectasis and/or fibrotic change. Consider mild dependent edema as well as 

small left effusion.














- Data Points


Laboratory Results: 


 Laboratory Results





 10/05/18 15:50 





 10/05/18 15:50 





 











  10/05/18 10/05/18 10/05/18





  16:22 15:50 15:50


 


WBC      





    


 


RBC      





    


 


Hgb      





    


 


Hct      





    


 


MCV      





    


 


MCH      





    


 


MCHC      





    


 


RDW      





    


 


Plt Count      





    


 


MPV      





    


 


Neut % (Auto)      





    


 


Lymph % (Auto)      





    


 


Mono % (Auto)      





    


 


Eos % (Auto)      





    


 


Baso % (Auto)      





    


 


Nucleat RBC Rel Count      





    


 


Absolute Neuts (auto)      





    


 


Absolute Lymphs (auto)      





    


 


Absolute Monos (auto)      





    


 


Absolute Eos (auto)      





    


 


Absolute Basos (auto)      





    


 


Absolute Nucleated RBC      





    


 


Immature Gran %      





    


 


Immature Gran #      





    


 


Platelet Estimate      





    


 


PT      13.6 SEC SEC





     (12.0-15.0) 


 


INR      1.02 





     (0.83-1.16) 


 


APTT      31.8 SEC SEC





     (23.0-38.0) 


 


Puncture Site  RIGHT BRACHIAL     





    


 


Patient Temperature  36.0 DEGREES DEGREES    





    


 


pCO2  37 mmHg mmHg    





   (34-38)   


 


pO2  89 mmHg H mmHg    





   (65-75)   


 


Total CO2  27 mEq/L mEq/L    





   (23-27)   


 


ABG pH  7.45     





   (7.35-7.45)   


 


ABG HCO3  25 mEq/L mEq/L    





   (22-26)   


 


ABG O2 Saturation  97 % H %    





   (92-95)   


 


ABG Base Excess  1.6 mEq/L mEq/L    





   (-2.5-2.5)   


 


VBG Lactic Acid      





    


 


Total O2 Concentration  4.0 LITERS LITERS    





    


 


Sodium    134 mEq/L L mEq/L  





    (135-145)  


 


Potassium    4.5 mEq/L mEq/L  





    (3.3-5.0)  


 


Chloride    99 mEq/L mEq/L  





    ()  


 


Carbon Dioxide    28 mEq/l mEq/l  





    (22-31)  


 


Anion Gap    7 mEq/L L mEq/L  





    (8-16)  


 


BUN    17 mg/dL mg/dL  





    (7-23)  


 


Creatinine    0.8 mg/dL mg/dL  





    (0.7-1.3)  


 


Estimated GFR    > 60   





    


 


Glucose    121 mg/dL H mg/dL  





    ()  


 


Calcium    8.4 mg/dL L mg/dL  





    (8.5-10.4)  


 


Total Bilirubin    0.9 mg/dL mg/dL  





    (0.1-1.4)  


 


Conjugated Bilirubin    0.2 mg/dL mg/dL  





    (0.0-0.5)  


 


Unconjugated Bilirubin    0.7 mg/dL mg/dL  





    (0.0-1.1)  


 


AST    23 IU/L IU/L  





    (17-59)  


 


ALT    21 IU/L IU/L  





    (21-72)  


 


Alkaline Phosphatase    59 IU/L IU/L  





    ()  


 


Total Protein    5.9 g/dL L g/dL  





    (6.3-8.2)  


 


Albumin    3.3 g/dL L g/dL  





    (3.5-5.0)  














  10/05/18 10/05/18





  15:50 15:50


 


WBC  8.51 10^3/uL 10^3/uL  





   (3.80-9.50)  


 


RBC  4.19 10^6/uL L 10^6/uL  





   (4.40-6.38)  


 


Hgb  13.2 g/dL L g/dL  





   (13.7-17.5)  


 


Hct  39.6 % L %  





   (40.0-51.0)  


 


MCV  94.5 fL fL  





   (81.5-99.8)  


 


MCH  31.5 pg pg  





   (27.9-34.1)  


 


MCHC  33.3 g/dL g/dL  





   (32.4-36.7)  


 


RDW  14.4 % %  





   (11.5-15.2)  


 


Plt Count  190 10^3/uL 10^3/uL  





   (150-400)  


 


MPV  9.9 fL fL  





   (8.7-11.7)  


 


Neut % (Auto)  Pending   





   


 


Lymph % (Auto)  Pending   





   


 


Mono % (Auto)  Pending   





   


 


Eos % (Auto)  Pending   





   


 


Baso % (Auto)  Pending   





   


 


Nucleat RBC Rel Count  Pending   





   


 


Absolute Neuts (auto)  Pending   





   


 


Absolute Lymphs (auto)  Pending   





   


 


Absolute Monos (auto)  Pending   





   


 


Absolute Eos (auto)  Pending   





   


 


Absolute Basos (auto)  Pending   





   


 


Absolute Nucleated RBC  Pending   





   


 


Immature Gran %  Pending   





   


 


Immature Gran #  Pending   





   


 


Platelet Estimate  Pending   





   


 


PT    





   


 


INR    





   


 


APTT    





   


 


Puncture Site    





   


 


Patient Temperature    





   


 


pCO2    





   


 


pO2    





   


 


Total CO2    





   


 


ABG pH    





   


 


ABG HCO3    





   


 


ABG O2 Saturation    





   


 


ABG Base Excess    





   


 


VBG Lactic Acid    1.1 mmol/L mmol/L





    (0.7-2.1) 


 


Total O2 Concentration    





   


 


Sodium    





   


 


Potassium    





   


 


Chloride    





   


 


Carbon Dioxide    





   


 


Anion Gap    





   


 


BUN    





   


 


Creatinine    





   


 


Estimated GFR    





   


 


Glucose    





   


 


Calcium    





   


 


Total Bilirubin    





   


 


Conjugated Bilirubin    





   


 


Unconjugated Bilirubin    





   


 


AST    





   


 


ALT    





   


 


Alkaline Phosphatase    





   


 


Total Protein    





   


 


Albumin    





   











Medications Given: 


 








Discontinued Medications





Sodium Chloride (Ns)  1,000 mls @ 0 mls/hr IV EDNOW ONE; Wide Open


   PRN Reason: Protocol


   Stop: 10/05/18 15:48


   Last Admin: 10/05/18 16:21 Dose:  1,000 mls








Departure





- Departure


Referrals: 


NONE *PRIMARY CARE P,. [Primary Care Provider] - As per Instructions

## 2018-10-05 NOTE — GHP
DATE OF ADMISSION:  10/05/2018



REASON FOR ADMISSION:  Generalized weakness and altered mental status, status 
post decompression surgery.



HOSPITAL COURSE/HISTORY OF MAJOR MEDICAL FINDINGS:  The patient is an 78-year-
old gentleman who was just recently discharged from [facility] 
on 10/03/2018.  He is a patient of Dr. Reagan Scott's who underwent an L4-5 
laminectomy on 10/03/2018.  The patient was at home with his wife today.  She 
states that yesterday he slept from 7:00 p.m. to 7:00 a.m.  When she woke him 
up this morning he was having some pains and she gave him his standard dose of 
Percocet, which is 10/325.  The patient then felt very tired and sat down in a 
chair and did not want to get up.  The home physical therapist later came out 
to visit and he did not want to do anything other than sit in the chair and get 
back to bed.  At 1 o'clock he woke up and was having more pain, so she gave him 
his standard dose of 10 mg of Percocet.  He has been taking Robaxin as well, as 
needed for muscle spasm.  He normally does take Valium, but has not taken that 
today.  He is having low back pain.  He denies any fevers, chills, nausea, 
vomiting, dizziness.  Denies any new leg weakness, numbness, or tingling.



REVIEW OF SYSTEMS:  Review of systems is negative other than what is stated in 
the HPI.  Please see pertinent negatives and positives.



PAST MEDICAL HISTORY:  Significant for hyperlipidemia, hypertension, sciatica, 
and back pain.



PAST SURGICAL HISTORY:  Significant for an L4-5 laminectomy on 10/03/2018.



FAMILY HISTORY:  Noncontributory. 



SOCIAL HISTORY:  Patient is here with his wife.  He does not smoke. Does drink 
a daily double scotch. 



ALLERGIES:  No known drug allergies.



HOME MEDICATIONS:  Include Lipitor, Lexapro, Proscar, furosemide, Betapace, 
tramadol, Eliquis, methocarbamol, and oxycodone.



PHYSICAL EXAM:  VITALS:  Temp 37.4, heart rate is 70, respiratory rate is 16, 
BP is 127/86, he is 94% on 2 L nasal cannula.  GENERAL:  The patient is in no 
acute distress.  He is alert and oriented to person and place.  He is somewhat 
lethargic during the exam and is having a hard time keeping his eyes open.  
EXTREMITIES:  His strength is 5/5 in his bilateral upper and bilateral lower 
extremities, including his deltoids, triceps, biceps, wrist flexors, extensors, 
interossei, intrinsic , iliopsoas, hamstrings, quadriceps, plantar flexion, 
dorsiflexion, and EHL.  SKIN:  His incision does have some sanguineous staining 
on the dressing, but it is clean otherwise and there is no visible discharge or 
erythema or swelling.  NEUROLOGIC:  Sensation is intact in bilateral upper and 
bilateral lower extremities.



ASSESSMENT AND PLAN:  The patient is an 78-year-old gentleman who was brought 
to the emergency room today via emergency medical services for lethargy and 
somnolence, and generalized weakness.  He recently underwent a lumbar 
laminectomy at L4-5 on 10/03/2018 by Dr. Reagan Scott.  After discussion with his 
wife, it appears that the patient has been taking pain medication as he has 
been for the last few months and has not had any new pain medication.  The 
emergency room has already sent off lab work as well as a urinalysis to further 
look for other sources of his generalized weakness.  We will admit him to the 
hospital to further work this up with a consultation of the hospitalist.  Given 
the patient's age and recent surgical procedure, his overall weakness, we will 
have Physical Therapy/Occupational Therapy see him, as he may require 
significant care post discharge.  This was discussed in detail with Dr. Sanchez Amador, who will also be seeing the patient today.





Job #:  [id number]

MTDD

## 2018-10-06 RX ADMIN — ACETAMINOPHEN SCH MG: 500 TABLET ORAL at 21:07

## 2018-10-06 RX ADMIN — POTASSIUM CHLORIDE SCH MEQ: 1500 TABLET, EXTENDED RELEASE ORAL at 21:06

## 2018-10-06 RX ADMIN — FINASTERIDE SCH MG: 5 TABLET, FILM COATED ORAL at 09:36

## 2018-10-06 RX ADMIN — Medication SCH MG: at 09:40

## 2018-10-06 RX ADMIN — APIXABAN SCH MG: 5 TABLET, FILM COATED ORAL at 09:35

## 2018-10-06 RX ADMIN — APIXABAN SCH MG: 5 TABLET, FILM COATED ORAL at 21:05

## 2018-10-06 RX ADMIN — FUROSEMIDE SCH MG: 40 TABLET ORAL at 21:07

## 2018-10-06 RX ADMIN — DOCUSATE SODIUM AND SENNOSIDES SCH TAB: 50; 8.6 TABLET ORAL at 21:04

## 2018-10-06 RX ADMIN — MENTHOL SCH PATCH: 1 SPRAY TOPICAL at 13:25

## 2018-10-06 RX ADMIN — SOTALOL HYDROCHLORIDE SCH MG: 80 TABLET ORAL at 21:07

## 2018-10-06 RX ADMIN — POTASSIUM CHLORIDE SCH MEQ: 1500 TABLET, EXTENDED RELEASE ORAL at 09:40

## 2018-10-06 RX ADMIN — SOTALOL HYDROCHLORIDE SCH MG: 80 TABLET ORAL at 09:38

## 2018-10-06 RX ADMIN — ACETAMINOPHEN SCH MG: 500 TABLET ORAL at 13:58

## 2018-10-06 RX ADMIN — DOCUSATE SODIUM AND SENNOSIDES SCH TAB: 50; 8.6 TABLET ORAL at 09:40

## 2018-10-06 RX ADMIN — ATORVASTATIN CALCIUM SCH MG: 40 TABLET, FILM COATED ORAL at 21:06

## 2018-10-06 RX ADMIN — ACETAMINOPHEN SCH MG: 500 TABLET ORAL at 06:17

## 2018-10-06 RX ADMIN — FAMOTIDINE SCH MG: 20 TABLET, FILM COATED ORAL at 21:05

## 2018-10-06 RX ADMIN — FAMOTIDINE SCH MG: 20 TABLET, FILM COATED ORAL at 09:39

## 2018-10-06 NOTE — NEUSURGPN
Assessment/Plan: 





A: 77 yo male sp lami with angie a few days ago presenting with failure to 

thrive at home and some confusion.





P:


-Neuro: confusion much improved this morning, continue to limit narcotics


-Optimize pain management- limit narcotics when able, Tylenol ok and muscle 

relaxants


-PT/OT- will need SNF placement


-DVT: TEDs, SCDs, Can restart Plavix now


-Dispo- most likely will need SNF placement this time, Monday?


-Call NS with any questions or concerns








S: Patient states he is doing much better than yesterday. 


O:


NAD, VSS


Currently walking into bathroom with walker


Alert, oriented and speaking clearly


COREAS X4


Gait- observed assisted with walker











- Physician


Discussed Patient with : Angie





Neurosurgery Physical Exam





- Vitals, I&O, Labs





 I and O











 10/05/18 10/06/18 10/07/18





 05:59 05:59 05:59


 


Intake Total  900 


 


Output Total  250 150


 


Balance  650 -150


 


Intake:   


 


  Oral (ml)  400 


 


  IV Infused (ml)  500 


 


Output:   


 


  Urine (ml)  250 150


 


    Urinal  250 150


 


Other:   


 


  Intake Quantity  Yes 





  Sufficient   


 


  Number of Voids   


 


    Incontinence  1 


 


    Urinal  1 








 Vital Signs











Temp Pulse Resp BP Pulse Ox


 


 36.7 C   70   16   123/74 H  96 


 


 10/06/18 11:36  10/06/18 11:36  10/06/18 11:36  10/06/18 11:36  10/06/18 11:36














ICD10 Worksheet


Patient Problems: 


 Problems











Problem Status Onset


 


S/P lumbar laminectomy Acute

## 2018-10-06 NOTE — ASMTCMCOM
CM Note

 

CM Note                       

Notes:

Pt had surgery 10/03/18 for spinal stenosis, now in with weakness. PT/OT rec SNF. Pt and wife 

amenable to SNF, choose [facility] and [facility]. Pt accepted at both [facility] and [facility], each can also obtain auth over the weekend. Neurosurgey THANH Alvarez updated. 



D/C plan of care: [facility] likely tomorrow. 

 

Date Signed:  10/06/2018 02:43 PM

Electronically Signed By:JAISON Lee

## 2018-10-06 NOTE — HOSPPROG
Hospitalist Progress Note


Assessment/Plan: 








Virgil is an 79 y/o man who was recently her on October 3rd w spinal stenosis 

and neuropathic symptoms.  He was discharged on October 4th. He returned to the 

hospital due to some confusion and difficulty w standing.  Today is my first 

encounter w the patient, chart reviewed.





* mild decline in cognition 


-has been on Valium and narcotics likely causing this


-avoid Benadryl in the elderly


-reviewed his labs; no s/sx of infection


-reviewed his care with his wife and w the patient, he has been taking oxy IR 

for past 6 months regularly


-he is clear and oriented this morning





*back pain secondary to stenosis


-will add Lidoderm patch


-scheduled Tylenol





*weakness


-PT is recommending SNF





* right-sided congestive heart failure, chronic 


-resumed Lasix but will give one dose iv x one now due to lower ext swelling





* left heart disease with coronary stents, no evidence of left-sided failure or 

ischemia at present





* history of AFib currently sinus with paced ventricle


-Eliquis





*plan: recommending dc to SNF, wife said he has been having some difficulty w 

activity at home, should be weaned off of the oxy





T





Subjective: Perfecto has no complaints, would prefer to go home but agrees he could 

use some strengthening.


Objective: 


 Vital Signs











Temp Pulse Resp BP Pulse Ox


 


 36.9 C   69   16   113/58 L  96 


 


 10/06/18 07:14  10/06/18 07:14  10/06/18 07:14  10/06/18 07:14  10/06/18 07:14








 











 10/05/18 10/06/18 10/07/18





 05:59 05:59 05:59


 


Intake Total  900 


 


Output Total  250 150


 


Balance  650 -150








 











PT  13.6 SEC (12.0-15.0)   10/05/18  15:50    


 


INR  1.02  (0.83-1.16)   10/05/18  15:50    














- Physical Exam


Constitutional: no apparent distress, obese


Eyes: PERRL, other (glasses)


Ears, Nose, Mouth, Throat: hearing normal


Cardiovascular: regular rate and rhythym, edema (bilateral lower extremity)


Respiratory: no respiratory distress, reduced air movement


Skin: warm


Musculoskeletal: muscular tenderness (low back), generalized weakness


Neurologic: AAOx3


Psychiatric: interacting appropriately





ICD10 Worksheet


Patient Problems: 


 Problems











Problem Status Onset


 


S/P lumbar laminectomy Acute

## 2018-10-06 NOTE — PDMN
Medical Necessity


Medical necessity: MCG Systemic condition  adult FTT-  recent surgery with 

difficulty ambulating ,weakness , lethargy, and confusion  poss due to 

narcotics , pt will need PT/OT, further monitoring of mental status, pain 

management,

## 2018-10-07 RX ADMIN — DOCUSATE SODIUM AND SENNOSIDES SCH: 50; 8.6 TABLET ORAL at 23:27

## 2018-10-07 RX ADMIN — ACETAMINOPHEN SCH MG: 500 TABLET ORAL at 21:41

## 2018-10-07 RX ADMIN — FAMOTIDINE SCH MG: 20 TABLET, FILM COATED ORAL at 08:20

## 2018-10-07 RX ADMIN — SOTALOL HYDROCHLORIDE SCH MG: 80 TABLET ORAL at 21:41

## 2018-10-07 RX ADMIN — POTASSIUM CHLORIDE SCH MEQ: 1500 TABLET, EXTENDED RELEASE ORAL at 21:41

## 2018-10-07 RX ADMIN — FINASTERIDE SCH MG: 5 TABLET, FILM COATED ORAL at 08:23

## 2018-10-07 RX ADMIN — Medication SCH MG: at 08:23

## 2018-10-07 RX ADMIN — SOTALOL HYDROCHLORIDE SCH MG: 80 TABLET ORAL at 08:21

## 2018-10-07 RX ADMIN — FUROSEMIDE SCH MG: 40 TABLET ORAL at 21:41

## 2018-10-07 RX ADMIN — MENTHOL SCH PATCH: 1 SPRAY TOPICAL at 08:26

## 2018-10-07 RX ADMIN — APIXABAN SCH MG: 5 TABLET, FILM COATED ORAL at 08:23

## 2018-10-07 RX ADMIN — DOCUSATE SODIUM AND SENNOSIDES SCH: 50; 8.6 TABLET ORAL at 08:26

## 2018-10-07 RX ADMIN — FUROSEMIDE SCH MG: 40 TABLET ORAL at 08:20

## 2018-10-07 RX ADMIN — ATORVASTATIN CALCIUM SCH MG: 40 TABLET, FILM COATED ORAL at 21:41

## 2018-10-07 RX ADMIN — ACETAMINOPHEN SCH MG: 500 TABLET ORAL at 05:09

## 2018-10-07 RX ADMIN — FAMOTIDINE SCH MG: 20 TABLET, FILM COATED ORAL at 21:41

## 2018-10-07 RX ADMIN — ACETAMINOPHEN SCH MG: 500 TABLET ORAL at 13:51

## 2018-10-07 RX ADMIN — APIXABAN SCH MG: 5 TABLET, FILM COATED ORAL at 21:41

## 2018-10-07 RX ADMIN — LISINOPRIL SCH MG: 10 TABLET ORAL at 08:20

## 2018-10-07 RX ADMIN — POTASSIUM CHLORIDE SCH MEQ: 1500 TABLET, EXTENDED RELEASE ORAL at 08:24

## 2018-10-07 NOTE — ASMTCMCOM
CM Note

 

CM Note                       

Notes:

Spoke with patient and he would like to go home with HC. PT recommending SNF vs HC; OT=SNF. Doing 

better on walks around Unit with RN. Has [employer] Adv INS.

 

Date Signed:  10/07/2018 05:05 PM

Electronically Signed By:Joya Mendez LCSW

## 2018-10-07 NOTE — NEUSURGPN
Assessment/Plan: 





A: 79 yo male sp lami with agnie a few days ago presenting with failure to 

thrive at home and some confusion.





P:


-Neuro: confusion much improved this morning. Has some nausea this morning 

however. 


-Nausea- spoke with hospitalist could be some signs of withdrawal, will place 

back on small amount of oxy 2.5 to avoid withdrawal, will observe today and 

revisit dc tomorrow


-Optimize pain management-


-PT/OT- 


-DVT: TEDs, SCDs, Can restart Plavix now


-Dispo- most likely will need SNF placement this time. He was approved for SNF 

as of today to [facility]. Spoke with wife who still thinks that they can go 

home and both hospitalist and I spoke with her this morning discouraging this 

and encouraging placement at least short term with SNF. They will talk about 

this again tomorrow.


-Call NS with any questions or concerns








S: Patient states he is doing much better than yesterday. Has nausea now that 

is bothering him, no vomiting. 


O:


NAD, VSS


Sitting in chair


Alert, oriented and speaking clearly


COREAS X4


BLE 5/5=


Sensation intact to lt touch


Incision c/d/i- steri strips in place   


Gait- observed assisted with walker











- Physician


Discussed Patient with : Angie





Neurosurgery Physical Exam





- Vitals, I&O, Labs





 I and O











 10/06/18 10/07/18 10/08/18





 05:59 05:59 05:59


 


Intake Total 900 1050 


 


Output Total 250 1300 


 


Balance 650 -250 


 


Intake:   


 


  Oral (ml) 400 1050 


 


  IV Infused (ml) 500  


 


Output:   


 


  Urine (ml) 250 1300 


 


    Toilet  650 


 


    Urinal 250 650 


 


Other:   


 


  Intake Quantity Yes Yes 





  Sufficient   


 


  Number of Voids   


 


    Incontinence 1 1 


 


    Toilet  1 


 


    Urinal 1 1 


 


  Number of Stools   


 


    Toilet  1 








 Vital Signs











Temp Pulse Resp BP Pulse Ox


 


 36.8 C   67   15   123/57 H  95 


 


 10/07/18 12:00  10/07/18 12:00  10/07/18 12:00  10/07/18 12:00  10/07/18 12:00














ICD10 Worksheet


Patient Problems: 


 Problems











Problem Status Onset


 


S/P lumbar laminectomy Acute

## 2018-10-08 VITALS — SYSTOLIC BLOOD PRESSURE: 138 MMHG | DIASTOLIC BLOOD PRESSURE: 78 MMHG

## 2018-10-08 RX ADMIN — APIXABAN SCH MG: 5 TABLET, FILM COATED ORAL at 09:04

## 2018-10-08 RX ADMIN — FINASTERIDE SCH MG: 5 TABLET, FILM COATED ORAL at 09:03

## 2018-10-08 RX ADMIN — DOCUSATE SODIUM AND SENNOSIDES SCH TAB: 50; 8.6 TABLET ORAL at 09:03

## 2018-10-08 RX ADMIN — Medication SCH MG: at 09:01

## 2018-10-08 RX ADMIN — MENTHOL SCH PATCH: 1 SPRAY TOPICAL at 09:05

## 2018-10-08 RX ADMIN — LISINOPRIL SCH MG: 10 TABLET ORAL at 09:04

## 2018-10-08 RX ADMIN — POTASSIUM CHLORIDE SCH MEQ: 1500 TABLET, EXTENDED RELEASE ORAL at 09:04

## 2018-10-08 RX ADMIN — ACETAMINOPHEN SCH MG: 500 TABLET ORAL at 05:56

## 2018-10-08 RX ADMIN — FAMOTIDINE SCH MG: 20 TABLET, FILM COATED ORAL at 09:03

## 2018-10-08 RX ADMIN — ACETAMINOPHEN SCH: 500 TABLET ORAL at 16:55

## 2018-10-08 RX ADMIN — FUROSEMIDE SCH MG: 40 TABLET ORAL at 09:04

## 2018-10-08 RX ADMIN — SOTALOL HYDROCHLORIDE SCH MG: 80 TABLET ORAL at 09:01

## 2018-10-08 NOTE — ASMTCMCOM
CM Note

 

CM Note                       

Notes:

Spoke with Kala at [facility], auth has already been initiated - patient should be able to be 

admitted whenever medically ready for discharge.  CM will continue to follow.



Plan:  [facility]

 

Date Signed:  10/08/2018 02:58 PM

Electronically Signed By:Alexandra Bright RN

## 2018-10-08 NOTE — SOAPPROG
SOAP Progress Note


Assessment/Plan: 


Assessment: 77 yo M readmitted after  L4/5 laminectomy


























Plan:


neuro: stable, leg pain improved after surgery, minimal back pain


nausea this am, will monitor but hopefully will improve


diarrhea this am, will monitor


PT/OT


Ok to transfer SNF when cleared


please call with neuro changes


discussed with Dr Scott





10/08/18 09:12





10/08/18 09:15





Subjective: 





no back pain, no leg pain, nausea and diarrhea this am.


Objective: 





 Vital Signs











Temp Pulse Resp BP Pulse Ox


 


 36.8 C   70   18   124/69 H  93 


 


 10/08/18 08:00  10/08/18 09:01  10/08/18 08:00  10/08/18 09:04  10/08/18 08:00








 











 10/07/18 10/08/18 10/09/18





 05:59 05:59 05:59


 


Intake Total 1050 200 750


 


Output Total 1300 1650 500


 


Balance -250 -1450 250








 











PT  13.6 SEC (12.0-15.0)   10/05/18  15:50    


 


INR  1.02  (0.83-1.16)   10/05/18  15:50    








AAOx4, +FC


PERRL, no facial droop


5/5


+ light touch


C/D/I 





ICD10 Worksheet


Patient Problems: 


 Problems











Problem Status Onset


 


S/P lumbar laminectomy Acute

## 2018-10-08 NOTE — ASMTLACE
LACE

 

Length of stay for            Answers:  3 days                                

current admission                                                             

Comorbidities - select        Answers:  Coronary Artery Disease               

all that apply                                                                

                                        Other                         Notes:  HTN

# of Emergency department     Answers:  1-2                                   

visits in the last 6                                                          

months                                                                        

Score: 7

 

Date Signed:  10/08/2018 04:10 PM

Electronically Signed By:Alexandra Bright RN

## 2018-10-08 NOTE — ASDISCHSUM
----------------------------------------------

Discharge Information

----------------------------------------------

Plan Status:SNF                                      Medically Cleared to Leave:

Discharge Date:10/08/2018 05:37 PM                   CM D/C Disposition:Skilled Nursing Facility

ADT D/C Disposition:Skilled Nursing Facility         Projected Discharge Date:10/07/2018 11:00 AM

Transportation at D/C:[facility]                        Discharge Delay Reason:

Follow-Up Date:10/07/2018 11:00 AM                   Discharge Slot:

Final Diagnosis:

----------------------------------------------

Placement Information

----------------------------------------------

Referral Type:*Nursing Home/SNF                      Referral ID:[id number]

Provider Name:[facility]//[facility]

Address 1:[address] [place] [address]                              Phone [phone]

Address 2:                                           Fax [phone]

City:[place]                                        Selection Factors:

State:[place]

 

----------------------------------------------

Patient Contact Information

----------------------------------------------

Contact Name:GOMEZ                           Relationship:Wife

Address:[address]                              Home [phone]

                                                     Work Phone:

[facility] [place]                                        Alternate Phone:

[facility]/Zip Code:[place] [address]                              Email:

----------------------------------------------

Financial Information

----------------------------------------------

Financial Class:Medicare Advantage Plans

Primary Plan Desc:[employer] PLANS         Primary Plan Number:[id number]

Secondary Plan Desc:                                 Secondary Plan Number:

 

 

----------------------------------------------

Assessment Information

----------------------------------------------

----------------------------------------------

LACE

----------------------------------------------

LACE

 

Length of stay for            Answers:  3 days                                

current admission                                                             

Comorbidities - select        Answers:  Coronary Artery Disease               

all that apply                                                                

                                        Other                         Notes:  HTN

# of Emergency department     Answers:  1-2                                   

visits in the last 6                                                          

months                                                                        

Score: 7

 

Date Signed:  10/08/2018 04:10 PM

Electronically Signed By:Alexandra Bright RN

 

 

----------------------------------------------

[facility] CM Progress Note

----------------------------------------------

CM Note

 

CM Note                       

Notes:

Pt had surgery 10/03/18 for spinal stenosis, now in with weakness. PT/OT rec SNF. Pt and wife 

amenable to SNF, choose [facility] and [facility]. Pt accepted at both [facility] and [facility], each can also obtain auth over the weekend. Neurosurgey THANH Alvarez updated. 



D/C plan of care: [facility] likely tomorrow. 

 

Date Signed:  10/06/2018 02:43 PM

Electronically Signed By:JAISON Lee

 

 

----------------------------------------------

[facility] CM Progress Note

----------------------------------------------

CM Note

 

CM Note                       

Notes:

Spoke with patient and he would like to go home with HC. PT recommending SNF vs HC; OT=SNF. Doing 

better on walks around Unit with RN. Has [employer] Adv INS.

 

Date Signed:  10/07/2018 05:05 PM

Electronically Signed By:Joya Mendez LCSW

 

 

----------------------------------------------

[facility] CM Progress Note

----------------------------------------------

CM Note

 

CM Note                       

Notes:

CM met with pt and his wife.  His wife very concerned that he will be "sitting too much" in a SNF 

Rehab; she states this is what keeps her from agreeing to it.  She would like him at home with 

[facility].  Spoke to [facility] and received answers to her questions: could she come and take 

him for walks within the facility and how long would he need to stay (she doesn't want him there 

for more than a week).  She was told a minimum stay of 2 weeks is likely and she could take him for 


walks.  She did decide that he could go to [facility] .  Spoke to [facility] and they will be extra 

careful to keep her informed re his care and needs.  CM to follow.



D/C Plan:  [facility]



 

 

Date Signed:  10/08/2018 12:30 PM

Electronically Signed By:Nany Godoy

 

 

----------------------------------------------

[facility] CM Progress Note

----------------------------------------------

CM Note

 

CM Note                       

Notes:

Spoke with Kala at [facility], auth has already been initiated - patient should be able to be 

admitted whenever medically ready for discharge.  CM will continue to follow.



Plan:  [facility]

 

Date Signed:  10/08/2018 02:58 PM

Electronically Signed By:Alexandra Bright RN

 

 

----------------------------------------------

Case Management Discharge Plan Note

----------------------------------------------

Case Management Discharge

 

Discharge Order Complete?     Answers:  Yes                                   

Patient to Obtain             Answers:  Other                         Notes:  [facility]

Medications                                                                   

Transportation Arranged       Answers:  [facility] Stretcher                         

Transport will Pick (Date     10/08/2018 04:30 PM

& Time)                       

Case Management Transport     Answers:  Yes                                   

Form Complete                                                                 

Faxed Final Orders            Answers:  Yes                                   

Agency/Facility Transfer      Answers:  Yes                                   

Report Printed & Faxed to                                                     

Receiving Agency                                                              

Family Notified               Answers:  Yes                                   

Discharge Comments            

Notes:

Patient will d/c to [facility] today.  All orders sent via [employer].  Transport scheduled 

with [facility] for 1630-  arranged, no PCS required as  will be billed for transportation. D/W RN who 


will call report.

 

Date Signed:  10/08/2018 04:12 PM

Electronically Signed By:Alexandra Bright RN

 

 

----------------------------------------------

Intervention Information

----------------------------------------------

Intervention Type:*IM-Signed                         Date of Service:10/08/2018 03:49 PM

Patient Type:Inpatient                               Staff Member:Phuong Kumar

Hours:                                               Discipline:

Severity:                                            Comment:

## 2018-10-08 NOTE — HOSPPROG
Hospitalist Progress Note


Assessment/Plan: 








Virgil is an 79 y/o man who was recently her on October 3rd w spinal stenosis 

and neuropathic symptoms.  He was discharged on October 4th. He returned to the 

hospital due to some confusion and difficulty w standing.  





* mild decline in cognition 


-has been on Valium and narcotics likely causing this


-avoid Benadryl in the elderly


-reviewed his labs; no s/sx of infection


-reviewed his care with his wife and w the patient, he has been taking oxy IR 

for past 6 months regularly


-he is clear and oriented today





*back pain secondary to stenosis


-will add Lidoderm patch


-scheduled Tylenol


-no c/o back pain today


-needs frequent reinforcement on back precautions





*weakness


-PT is recommending SNF





* right-sided congestive heart failure, chronic 


-stable, resumed his home dose of oral Lasix





* left heart disease with coronary stents, no evidence of left-sided failure or 

ischemia at present





* history of AFib currently sinus with paced ventricle


-Eliquis





*plan: would recommend going to SNF. Reviewed this in detail w Perfecto and he is 

overall agreeable.  Would not restart narcotics on dc.  


Subjective: Perfecto has no complaints.


Objective: 


 Vital Signs











Temp Pulse Resp BP Pulse Ox


 


 36.8 C   70   18   124/69 H  93 


 


 10/08/18 08:00  10/08/18 09:01  10/08/18 08:00  10/08/18 09:04  10/08/18 08:00








 











 10/07/18 10/08/18 10/09/18





 05:59 05:59 05:59


 


Intake Total 1050 200 750


 


Output Total 1300 1650 500


 


Balance -250 -1450 250








 











PT  13.6 SEC (12.0-15.0)   10/05/18  15:50    


 


INR  1.02  (0.83-1.16)   10/05/18  15:50    














- Physical Exam


Constitutional: not in pain, obese


Eyes: PERRL


Ears, Nose, Mouth, Throat: hearing normal


Respiratory: no respiratory distress


Gastrointestinal: normoactive bowel sounds


Skin: warm


Musculoskeletal: generalized weakness (evaluated him while he was working w PT, 

needs frequent reinforcement not to bend and twist, gait slow)


Neurologic: AAOx3


Psychiatric: interacting appropriately, poor insight, poor memory





ICD10 Worksheet


Patient Problems: 


 Problems











Problem Status Onset


 


S/P lumbar laminectomy Acute

## 2018-10-08 NOTE — PDIAF
- Diagnosis


Code Status: Full Code





- Medication Management


Discharge Medications: 


 Medications to Continue on Transfer





Atorvastatin Calcium [Lipitor 40 mg (*)] 40 mg PO HS 09/24/18 [Last Taken 10/02/

18 07:30]


Calcium Carb W/Vit D [Calcium Carb W/Vit D 500/200 (*)] 500 mg PO DAILY 09/24/ 18 [Last Taken 10/01/18 07:00]


Escitalopram Oxalate [Lexapro 10 MG] 5 mg PO DAILY 09/24/18 [Last Taken 10/02/

18 19:30]


Finasteride [Proscar 5 MG (*)] 5 mg PO DAILY 09/24/18 [Last Taken 10/02/18 07:00

]


Herbals/Supplements -Info Only 1 ea PO DAILY 09/24/18 [Last Taken 09/29/18]


Sotalol HCl [Betapace 80 MG (*)] 80 mg PO BID 09/24/18 [Last Taken 10/02/18 07:

30]


traMADol [Ultram 50 mg (*)] 50 mg PO Q4 PRN 09/24/18 [Last Taken 10/02/18 21:00]


Apixaban [Eliquis] 5 mg PO BID #1 10/04/18 [Last Taken 09/29/18]


Diazepam [Valium 5 MG (*)] 2.5 mg PO DAILY PRN 10/05/18 [Last Taken Unknown]


Furosemide [Lasix 40 MG (*)] 40 mg PO BID 10/05/18 [Last Taken Unknown]


Lisinopril [Zestril 10 mg (*)] 5 mg PO DAILY 10/05/18 [Last Taken 10/05/18]


Potassium Chloride 20 meq PO BID 10/05/18 [Last Taken Unknown]


Acetaminophen [Tylenol  mg (*)] 1,000 mg PO Q8HRS  tab 10/08/18 [Last 

Taken Unknown]


Lidocaine 4%/Menthol 1% [Icy Hot Lidocaine/Menthol 4%/1% Patch (*)] 1 patch TD 

DAILY  patch 10/08/18 [Last Taken Unknown]


Methocarbamol [Robaxin 750 mg (*)] 750 mg PO TID PRN  tab 10/08/18 [Last Taken 

Unknown]


Patch Removal 1 ea TD DAILY21  patch 10/08/18 [Last Taken Unknown]


traMADol [Ultram 50 mg (*)] 50 mg PO Q6HRS PRN  tab 10/08/18 [Last Taken Unknown

]








Discharge Medications: Refer to the Discharge Home Medication list for PRN 

reason.


PICC Care - Routine: N/A





- Orders


Services needed: Registered Nurse, Physical Therapy, Occupational Therapy


Diet Recommendation: no restrictions on diet


Diet Texture: Regular Texture Diet


Dewey Stockings Discontinue Date: 10/15/18


Additional Instructions: 


discharge with lumbar laminectomy post op instructions from [email or web address]


follow up with Dr Scott in 2 weeks 





- Follow Up Care


Current Providers and Referrals: 


NONE *PRIMARY CARE P,. [Unknown] - As per Instructions

## 2018-10-08 NOTE — ASMTCMCOM
CM Note

 

CM Note                       

Notes:

CM met with pt and his wife.  His wife very concerned that he will be "sitting too much" in a SNF 

Rehab; she states this is what keeps her from agreeing to it.  She would like him at home with 

[facility].  Spoke to [facility] and received answers to her questions: could she come and take 

him for walks within the facility and how long would he need to stay (she doesn't want him there 

for more than a week).  She was told a minimum stay of 2 weeks is likely and she could take him for 


walks.  She did decide that he could go to [facility] .  Spoke to [facility] and they will be extra 

careful to keep her informed re his care and needs.  CM to follow.



D/C Plan:  [facility]



 

 

Date Signed:  10/08/2018 12:30 PM

Electronically Signed By:Nany Godoy

## 2018-10-08 NOTE — ASMTDCNOTE
Case Management Discharge

 

Discharge Order Complete?     Answers:  Yes                                   

Patient to Obtain             Answers:  Other                         Notes:   of [facility]                                                                   

Transportation Arranged       Answers:  [facility], [place] Stretcher                         

Transport will Pick (Date     10/08/2018 04:30 PM

& Time)                       

Case Management Transport     Answers:  Yes                                   

Form Complete                                                                 

Faxed Final Orders            Answers:  Yes                                   

Agency/Facility Transfer      Answers:  Yes                                   

Report Printed & Faxed to                                                     

Receiving Agency                                                              

Family Notified               Answers:  Yes                                   

Discharge Comments            

Notes:

Patient will d/c to  of [place] today.  All orders sent via [employer].  Transport scheduled 

with [facility], [place] for 1630- [facility] arranged, no PCS required as LC will be billed for transportation. D/W RN who 


will call report.

 

Date Signed:  10/08/2018 04:12 PM

Electronically Signed By:Alexandra Bright RN

## 2018-10-10 NOTE — GDS
PRIMARY DIAGNOSIS:  Back pain, decreased mobility.



OPERATIONS/PROCEDURES:  None.



HOSPITAL COURSE:  Patient was admitted to the emergency room on October 5, 2018, with chief complaint
s of increasing confusion, increasing lethargy, and decreasing mobility due to the recent back surger
y on October 3, 2018.  Patient underwent a L4-5 laminectomy on October 3, 2018, with Dr. Scott and w
as discharged home the following day with home health care.  Patient's wife brought him to the emerge
ncy room the following day with increasing confusion, decreased mobility, and increasing weakness.  P
anthony was taking oxycodone for pain control, as well as Robaxin for muscle spasm.  Patient was unabl
e to ambulate himself, so they came into the emergency room.  



Patient was admitted to the medical-surgical floor where he was evaluated with Physical and Occupatio
nal Therapy.  Case Management was consulted and it is recommended that the patient be discharged to a
 skilled nursing facility when able.  Patient remained in stable condition during this hospitalizatio
n and his leg pain had improved after surgery.  Patient had minimal back pain at the end of this admi
ssion and it was being well controlled with limited narcotics.  Patient was discharged October 8, 201
8 to skilled nursing facility in stable condition.



CONSULTATIONS:  Physical and Occupational Therapy.



COMPLICATIONS:  None.



DISCHARGE CONDITION:  Stable.



DISCHARGE INSTRUCTIONS:  

1.  Patient may resume his blood thinning medications.

2.  He is to avoid any bending or twisting at the waist at this time.

3.  He is to avoid lifting greater than 10 pounds.  

4.  Patient may shower leaving the Steri-Strips in place.

5.  Patient to avoid submersion of the incision for the next 2-3 weeks.



DISCHARGE MEDICATIONS:  Please see medication reconciliation for details.





Job #:  [id number]

## 2018-10-13 NOTE — CPEKG
Test Reason : OPEN

Blood Pressure : ***/*** mmHG

Vent. Rate : 070 BPM     Atrial Rate : 227 BPM

   P-R Int : 188 ms          QRS Dur : 151 ms

    QT Int : 471 ms       P-R-T Axes : 000 -79 067 degrees

   QTc Int : 509 ms

 

Afib/flutter and ventricular-paced rhythm

 

Confirmed by Mitch Good ([id number]) on 10/13/2018 8:19:41 AM

 

Referred By:             Confirmed By:Mitch Good

## 2018-11-12 ENCOUNTER — HOSPITAL ENCOUNTER (OUTPATIENT)
Dept: HOSPITAL 80 - FCATH | Age: 78
Discharge: HOME | End: 2018-11-12
Attending: INTERNAL MEDICINE
Payer: COMMERCIAL

## 2018-11-12 DIAGNOSIS — R60.9: ICD-10-CM

## 2018-11-12 DIAGNOSIS — Z82.49: ICD-10-CM

## 2018-11-12 DIAGNOSIS — E66.9: ICD-10-CM

## 2018-11-12 DIAGNOSIS — G47.33: ICD-10-CM

## 2018-11-12 DIAGNOSIS — Z96.652: ICD-10-CM

## 2018-11-12 DIAGNOSIS — Z95.0: ICD-10-CM

## 2018-11-12 DIAGNOSIS — I49.5: ICD-10-CM

## 2018-11-12 DIAGNOSIS — N40.0: ICD-10-CM

## 2018-11-12 DIAGNOSIS — I10: ICD-10-CM

## 2018-11-12 DIAGNOSIS — Z79.01: ICD-10-CM

## 2018-11-12 DIAGNOSIS — E78.00: ICD-10-CM

## 2018-11-12 DIAGNOSIS — I25.10: ICD-10-CM

## 2018-11-12 DIAGNOSIS — R97.20: ICD-10-CM

## 2018-11-12 DIAGNOSIS — Z95.5: ICD-10-CM

## 2018-11-12 DIAGNOSIS — I48.2: Primary | ICD-10-CM

## 2018-11-12 LAB
INR PPP: 1.32 (ref 0.83–1.16)
PROTHROMBIN TIME: 16.6 SEC (ref 12–15)

## 2018-11-12 PROCEDURE — 5A2204Z RESTORATION OF CARDIAC RHYTHM, SINGLE: ICD-10-PCS | Performed by: INTERNAL MEDICINE

## 2018-11-12 NOTE — PDCARD
Cardioversion Procedure


Procedure: 


electrical cardioversion





Indications: other (Atrial Flutter.)


Consent: signed and in chart


Anticoagulation: eliquis


Procedural Details: 


Pads were placed in anterior-posterior position.





Synchronized cardioversion attempt #1: 200J


Results: normal sinus rhythm


Conclusions: successful cardioversion


Patient Problems: 


 Problems











Problem Status Onset


 


Back pain Acute  


 


S/P lumbar laminectomy Acute

## 2018-11-12 NOTE — CPEKG
Test Reason : OPEN

Blood Pressure : ***/*** mmHG

Vent. Rate : 060 BPM     Atrial Rate : 060 BPM

   P-R Int : 163 ms          QRS Dur : 164 ms

    QT Int : 571 ms       P-R-T Axes : 095 -78 079 degrees

   QTc Int : 571 ms

 

Atrial-ventricular dual-paced rhythm

 

Confirmed by Cal Velez ([id number]) on 11/12/2018 1:48:51 PM

 

Referred By:             Confirmed By:Cal Velez

## 2018-11-12 NOTE — CPEKG
Test Reason : OPEN

Blood Pressure : ***/*** mmHG

Vent. Rate : 070 BPM     Atrial Rate : 000 BPM

   P-R Int : 185 ms          QRS Dur : 151 ms

    QT Int : 519 ms       P-R-T Axes : 000 -79 072 degrees

   QTc Int : 561 ms

 

Afib/flutter and ventricular-paced rhythm

 

Confirmed by Cal Velez ([id number]) on 11/12/2018 11:01:00 AM

 

Referred By:             Confirmed By:Cal Velez

## 2018-11-12 NOTE — PDANEPAE
ANE History of Present Illness





a flutter for CV





ANE Past Medical History





- Cardiovascular History


Hx Hypertension: Yes


Hx Arrhythmias: Yes


Hx Chest Pain: No


Hx Coronary Artery / Peripheral Vascular Disease: Yes


Hx CHF / Valvular Disease: No


Hx Palpitations: No


Cardiovascular History Comment: cad with stent.  afib.  complete heart block 

with pacer.  hyperlipidemia





- Pulmonary History


Hx COPD: No


Hx Asthma/Reactive Airway Disease: No


Hx Recent Upper Respiratory Infection: No


Hx Oxygen in Use at Home: No


Hx Sleep Apnea: Yes


Pulmonary History Comment: mariama positive- doesn't use cpap and hasn't used in 

over a year





- Neurologic History


Hx Cerebrovascular Accident: No


Hx Seizures: No


Hx Dementia: No





- Endocrine History


Hx Diabetes: No


Obesity: yes





- Renal History


Hx Renal Disorders: No





- Liver History


Hx Hepatic Disorders: No





- Neurological & Psychiatric Hx


Hx Neurological and Psychiatric Disorders: Yes


Neurological / Psychiatric History Comment: anxiety





- Cancer History


Hx Cancer: No





- Congenital Disorder History


Hx Congenital Disorders: No





- GI History


Hx Gastrointestinal Disorders: Yes


Gastrointestinal History Comment: hx of bowel obstruction with resection





- Other Health History


Other Health History: wears glasses.  current eyelid infection treating with abx





- Chronic Pain History


Chronic Pain: Yes (back pain and legs)





- Surgical History


Prior Surgeries: pacemaker placed 04/07/14.  left TKA and revision.  broken 

shoulder repair.  bowel resection from obstruction.  hematoma evacuation to 

left thigh.  broken pelvis repair from fall off ladder





ANE Review of Systems


Review of systems is: negative


Review of Systems: 








- Exercise capacity


METS (RN): 4 METS





- Pacemaker


Date Pacemaker Last Checked: 03/19/18





ANE Patient History





- Allergies


Allergies/Adverse Reactions: 








No Known Allergies Allergy (Verified 09/28/18 19:00)


 








- Home Medications


Home medications: home medication list seen and reviewed


Home Medications: 








Atorvastatin Calcium [Lipitor 40 mg (*)] 40 mg PO HS 09/24/18 [Last Taken 10/02/

18 07:30]


Calcium Carb W/Vit D [Calcium Carb W/Vit D 500/200 (*)] 500 mg PO DAILY 09/24/ 18 [Last Taken 10/01/18 07:00]


Escitalopram Oxalate [Lexapro 10 MG] 5 mg PO DAILY 09/24/18 [Last Taken 10/02/

18 19:30]


Finasteride [Proscar 5 MG (*)] 5 mg PO DAILY 09/24/18 [Last Taken 10/02/18 07:00

]


Herbals/Supplements -Info Only 1 ea PO DAILY 09/24/18 [Last Taken 09/29/18]


Sotalol HCl [Betapace 80 MG (*)] 80 mg PO BID 09/24/18 [Last Taken 10/02/18 07:

30]


traMADol [Ultram 50 mg (*)] 50 mg PO Q4 PRN 09/24/18 [Last Taken 10/02/18 21:00]


Diazepam [Valium 5 MG (*)] 2.5 mg PO DAILY PRN 10/05/18 [Last Taken Unknown]


Furosemide [Lasix 40 MG (*)] 40 mg PO BID 10/05/18 [Last Taken Unknown]


Lisinopril [Zestril 10 mg (*)] 5 mg PO DAILY 10/05/18 [Last Taken 10/05/18]


Potassium Chloride 20 meq PO BID 10/05/18 [Last Taken Unknown]








- Anes Hx


Anes Hx: no prior problems





- Smoking Hx


Smoking Status: Never smoked





- Family Anes Hx


Family Hx Anesthesia Complications: none





ANE Labs/Vital Signs





- Labs


Result Diagrams: 


 11/12/18 11:10





ANE Physical Exam





- Airway


Neck exam: FROM


Mallampati Score: Class 2


Mouth exam: normal dental/mouth exam





- Pulmonary


Pulmonary: no respiratory distress





- Cardiovascular


Cardiovascular: regular rate and rhythym





- ASA Status


ASA Status: III





ANE Anesthesia Plan


Anesthesia Plan: GA with mask

## 2019-04-21 ENCOUNTER — HOSPITAL ENCOUNTER (EMERGENCY)
Dept: HOSPITAL 80 - FED | Age: 79
LOS: 1 days | Discharge: HOME | End: 2019-04-22
Payer: COMMERCIAL

## 2019-04-21 ENCOUNTER — HOSPITAL ENCOUNTER (EMERGENCY)
Dept: HOSPITAL 80 - FED | Age: 79
Discharge: HOME | End: 2019-04-21
Payer: COMMERCIAL

## 2019-04-21 VITALS — SYSTOLIC BLOOD PRESSURE: 123 MMHG | DIASTOLIC BLOOD PRESSURE: 63 MMHG

## 2019-04-21 VITALS — SYSTOLIC BLOOD PRESSURE: 174 MMHG | DIASTOLIC BLOOD PRESSURE: 93 MMHG

## 2019-04-21 DIAGNOSIS — I11.0: ICD-10-CM

## 2019-04-21 DIAGNOSIS — I50.9: ICD-10-CM

## 2019-04-21 DIAGNOSIS — T83.89XA: Primary | ICD-10-CM

## 2019-04-21 DIAGNOSIS — N39.0: ICD-10-CM

## 2019-04-21 DIAGNOSIS — N13.8: ICD-10-CM

## 2019-04-21 DIAGNOSIS — N40.1: Primary | ICD-10-CM

## 2019-04-21 PROCEDURE — 0T9B70Z DRAINAGE OF BLADDER WITH DRAINAGE DEVICE, VIA NATURAL OR ARTIFICIAL OPENING: ICD-10-PCS | Performed by: EMERGENCY MEDICINE

## 2019-04-21 PROCEDURE — BT40ZZZ ULTRASONOGRAPHY OF BLADDER: ICD-10-PCS | Performed by: EMERGENCY MEDICINE

## 2019-04-21 PROCEDURE — 4A0D7LZ MEASUREMENT OF URINARY VOLUME, VIA NATURAL OR ARTIFICIAL OPENING: ICD-10-PCS

## 2019-04-21 NOTE — EDPHY
H & P


Time Seen by Provider: 04/21/19 06:02


HPI/ROS: 





HPI





CHIEF COMPLAINT:  Unable to urinate





HISTORY OF PRESENT ILLNESS:  79-year-old male, history of coronary disease, CABG

, sick sinus syndrome, CHF, obesity, SONYA, BPH, CHF, hyperlipidemia, hypertension

, SBO, AAA, presents emergency room stating since 1:00 a.m. He is unable to 

urinate he is feels the urge to go.  He states he has had problems with his 

prostate before and takes BPH medications to urinate but is unable to do so 

since 1:00 a.m..  He feels distended, feels the urge to go.  Denies any chest 

pain, denies fever, denies vomiting, complains of suprapubic distention.  

States he tried to use the bathroom at 1:00 a.m. Was unable to do so.





Past Medical History:  Extensive medical history including sick sinus syndrome, 

pacemaker, CHF, obesity, SONYA, BPH, hyperlipidemia, hypertension, AAA, CABG, 

coronary disease, BPH





Past Surgical History:  CABG, pacemaker





Social History:  Denies drugs alcohol tobacco.





Family History:  Noncontributory








ROS   


REVIEW OF SYSTEMS:


10 Systems were reviewed and negative with the exception of the elements 

mentioned in the history of present illness.








Exam   


Constitutional elderly, nontoxic  triage nursing summary reviewed, vital signs 

reviewed, awake/alert. 


Eyes   normal conjunctivae and sclera, EOMI, PERRLA. 


HENT   normal inspection, atraumatic, moist mucus membranes, no epistaxis, neck 

supple/ no meningismus, no raccoon eyes. 


Respiratory   clear to auscultation bilaterally, normal breath sounds, no 

respiratory distress, no wheezing. 


Cardiovascular   rate normal, regular rhythm, no murmur, no edema, distal 

pulses normal. 


Gastrointestinal  suprapubic distention on exam, soft, non-tender, no rebound, 

no guarding, normal bowel sounds, no distension, no pulsatile mass. 


Genitourinary   no CVA tenderness. 


Musculoskeletal  no midline vertebral tenderness, full range of motion, no calf 

swelling, no tenderness of extremities, no meningismus, good pulses, 

neurovascularly intact.


Skin   pink, warm, & dry, no rash, skin atraumatic. 


Neurologic   awake, alert and oriented x 3, AAOx3, moves all 4 extremities 

equally, motor intact, sensory intact, CN II-XII intact, normal cerebellar, 

normal vision, normal speech. 


Psychiatric   normal mood/affect. 


Heme/Lymph/Immune   no lymphadenopathy.





Differential Diagnosis:  Differential diagnosis includes but is not limited to 

and in no particular order:  Urinary retention, urinary outflow obstruction, UTI

, cystitis Bowel obstruction, appendicitis, gallbladder disease, diverticulitis

, colitis, enteritis, perforated viscus, gastritis, GERD, esophagitis, urinary 

tract infection, pyelonephritis, kidney stones





Medical Decision Making:  Plan for this patient bladder scan, Bates catheter, UA





Re-evaluation:





Patient's bladder scan upon arrival 6:07 a.m. Shows 700+ cc of urine.





Discussed with the patient he agrees for Bates catheter placement.





800 cc of urine removed from Bates catheter.





Urinalysis reviewed shows blood as well as bacteria.  Urine culture will be 

sent.  Keflex 1st dose here in emergency room and Keflex for home.  Patient 

will also need to follow up with Urology





We discussed return precautions he understands return to the emergency room if 

develops worsening abdominal pain, fever, vomiting, not doing well.








7:31 a.m. Re-evaluation patient is feeling much better after Bates catheter 

placement.  He has no abdominal pain does not feel nauseous.  He is resting 

comfortably.


Urinalysis shows bacteria urine culture sent.  Keflex started in the emergency 

room.





Patient would like to go home.  Wife at bedside.





We discussed return precautions return emergency room if worsening abdominal 

pain, fever, back pain, vomiting, not doing well





Feels comfortable this plan he understands he needs to follow up with the 

Urology


Source: Patient





- Medical/Surgical History


Hx Asthma: No


Hx Chronic Respiratory Disease: No


Hx Diabetes: No


Hx Cardiac Disease: Yes


Hx Renal Disease: No


Hx Cirrhosis: No


Hx Alcoholism: No


Hx HIV/AIDS: No


Hx Splenectomy or Spleen Trauma: No


Other PMH: HTN, CHF, peripheral edema (+2), sleep apnea, l total knee 

replacement, pacer/defib





- Social History


Smoking Status: Never smoked


Constitutional: 


 Initial Vital Signs











Temperature (C)  36.5 C   04/21/19 06:01


 


Heart Rate  61   04/21/19 06:01


 


Respiratory Rate  16   04/21/19 06:01


 


Blood Pressure  139/70 H  04/21/19 06:01


 


O2 Sat (%)  93   04/21/19 06:01








 











O2 Delivery Mode               Room Air














Allergies/Adverse Reactions: 


 





No Known Allergies Allergy (Verified 09/28/18 19:00)


 








Home Medications: 














 Medication  Instructions  Recorded


 


Atorvastatin Calcium [Lipitor 40 40 mg PO HS 09/24/18





mg (*)]  


 


Calcium Carb W/Vit D [Calcium Carb 500 mg PO DAILY 09/24/18





W/Vit D 500/200 (*)]  


 


Escitalopram Oxalate [Lexapro 10 5 mg PO DAILY 09/24/18





MG]  


 


Finasteride [Proscar 5 MG (*)] 5 mg PO DAILY 09/24/18


 


Herbals/Supplements -Info Only 1 ea PO DAILY 09/24/18


 


Sotalol HCl [Betapace 80 MG (*)] 80 mg PO BID 09/24/18


 


traMADol [Ultram 50 mg (*)] 50 mg PO Q4 PRN 09/24/18


 


Apixaban [Eliquis] 5 mg PO BID #1 10/04/18


 


Diazepam [Valium 5 MG (*)] 2.5 mg PO DAILY PRN 10/05/18


 


Furosemide [Lasix 40 MG (*)] 40 mg PO BID 10/05/18


 


Lisinopril [Zestril 10 mg (*)] 5 mg PO DAILY 10/05/18


 


Potassium Chloride 20 meq PO BID 10/05/18


 


Acetaminophen [Tylenol  mg 1,000 mg PO Q8HRS  tab 10/08/18





(*)]  


 


Lidocaine 4%/Menthol 1% [Icy Hot 1 patch TD DAILY  patch 10/08/18





Lidocaine/Menthol 4%/1% Patch (*)]  


 


Methocarbamol [Robaxin 750 mg (*)] 750 mg PO TID PRN  tab 10/08/18


 


Patch Removal 1 ea TD DAILY21  patch 10/08/18


 


traMADol [Ultram 50 mg (*)] 50 mg PO Q6HRS PRN  tab 10/08/18


 


Cephalexin [Keflex] 500 mg PO Q6H #28 cap 04/21/19














Medical Decision Making





- Data Points


Laboratory Results: 


 











  04/21/19





  06:09


 


Urine Color  PALE YELLOW 





  


 


Urine Appearance  CLEAR 





  


 


Urine pH  6.0 





   (5.0-7.5) 


 


Ur Specific Gravity  1.004 





   (1.002-1.030) 


 


Urine Protein  NEGATIVE 





   (NEGATIVE) 


 


Urine Ketones  NEGATIVE 





   (NEGATIVE) 


 


Urine Blood  3+  H 





   (NEGATIVE) 


 


Urine Nitrate  NEGATIVE 





   (NEGATIVE) 


 


Urine Bilirubin  NEGATIVE 





   (NEGATIVE) 


 


Urine Urobilinogen  NEGATIVE EU EU





   (0.2-1.0) 


 


Ur Leukocyte Esterase  NEGATIVE 





   (NEGATIVE) 


 


Urine RBC  1-3 /hpf /hpf





   (0-3) 


 


Urine WBC  1-3 /hpf /hpf





   (0-3) 


 


Ur Epithelial Cells  NONE SEEN /lpf /lpf





   (NONE-1+) 


 


Urine Bacteria  TRACE /hpf H /hpf





   (NONE SEEN) 


 


Urine Mucus  TRACE /lpf /lpf





   (NONE-1+) 


 


Urine Glucose  NEGATIVE 





   (NEGATIVE) 











Medications Given: 


 








Discontinued Medications





Cephalexin (Keflex 500 Mg Prepack#4)  1 btl TAKEHOME EDNOW ONE


   PRN Reason: Protocol


   Stop: 04/21/19 06:41


   Last Admin: 04/21/19 06:54 Dose:  1 btl


Cephalexin HCl (Keflex)  500 mg PO EDNOW ONE


   PRN Reason: Protocol


   Stop: 04/21/19 06:41


   Last Admin: 04/21/19 06:55 Dose:  500 mg








Departure





- Departure


Disposition: Home, Routine, Self-Care


Clinical Impression: 


 Urinary outflow obstruction, UTI (urinary tract infection)





Condition: Good


Instructions:  Urinary Retention in Men (ED), Urinary Tract Infection in Men (ED

), Bates Catheter Placement and Care (ED)


Additional Instructions: 


1. Follow up with Urology


2. Return to the emergency room if develops worsening symptoms includes 

abdominal pain, fever, vomiting, not doing well





Referrals: 


Mitch Washington MD [Medical Doctor] - As per Instructions


Prescriptions: 


Cephalexin [Keflex] 500 mg PO Q6H #28 cap

## 2019-04-22 NOTE — EDPHY
H & P


Stated Complaint: CATHETER NOT DRAINING


Time Seen by Provider: 04/21/19 23:37


HPI/ROS: 





HPI


The patient presents with concerned that his Bates catheter is not working.  It 

was placed earlier today in the emergency department for UTI with urinary 

retention.  It initially was flowing well, however over the last 12 hr or so he 

has had limited output from the catheter.  He denies any current pain, passing 

blood clots..





REVIEW OF SYSTEMS


10 systems were reviewed and negative with the exception of the elements 

mentioned in the history of present illness.





PMHx:  Recent diagnosis of urinary tract infection, hypertension, CHF





Soc Hx:  Here with his wife, lives at home








PHYSICAL


General Appearance: Alert, no distress


Eyes: Pupils equal and round no pallor or injection


ENT, Mouth: Mucous membranes moist


Respiratory: There are no retractions, lungs are clear to auscultation


Cardiovascular:  Regular rate and rhythm 


Gastrointestinal:  Abdomen is soft and non-tender, no masses, bowel sounds 

normal 


Neurological:  A&O, moves all extremities


Skin:  Warm and dry, no rashes


Psychiatric:  Patient is oriented X 3, there is no agitation 





Source: Patient


Exam Limitations: No limitations





- Personal History


Current Tetanus Diphtheria and Acellular Pertussis (TDAP): Unsure





- Medical/Surgical History


Hx Asthma: No


Hx Chronic Respiratory Disease: No


Hx Diabetes: No


Hx Cardiac Disease: Yes


Hx Renal Disease: No


Hx Cirrhosis: No


Hx Alcoholism: No


Hx HIV/AIDS: No


Hx Splenectomy or Spleen Trauma: No


Other PMH: HTN, CHF, peripheral edema (+2), sleep apnea, l total knee 

replacement, pacer/defib, URINARY RETENTION





- Social History


Smoking Status: Never smoked


Constitutional: 


 Initial Vital Signs











Temperature (C)  36.4 C   04/21/19 23:18


 


Heart Rate  60   04/21/19 23:18


 


Respiratory Rate  16   04/21/19 23:18


 


Blood Pressure  174/93 H  04/21/19 23:18


 


O2 Sat (%)  92   04/21/19 23:18








 











O2 Delivery Mode               Room Air














Allergies/Adverse Reactions: 


 





No Known Allergies Allergy (Verified 09/28/18 19:00)


 








Home Medications: 














 Medication  Instructions  Recorded


 


Atorvastatin Calcium [Lipitor 40 40 mg PO HS 09/24/18





mg (*)]  


 


Calcium Carb W/Vit D [Calcium Carb 500 mg PO DAILY 09/24/18





W/Vit D 500/200 (*)]  


 


Escitalopram Oxalate [Lexapro 10 5 mg PO DAILY 09/24/18





MG]  


 


Finasteride [Proscar 5 MG (*)] 5 mg PO DAILY 09/24/18


 


Herbals/Supplements -Info Only 1 ea PO DAILY 09/24/18


 


Sotalol HCl [Betapace 80 MG (*)] 80 mg PO BID 09/24/18


 


traMADol [Ultram 50 mg (*)] 50 mg PO Q4 PRN 09/24/18


 


Apixaban [Eliquis] 5 mg PO BID #1 10/04/18


 


Diazepam [Valium 5 MG (*)] 2.5 mg PO DAILY PRN 10/05/18


 


Furosemide [Lasix 40 MG (*)] 40 mg PO BID 10/05/18


 


Lisinopril [Zestril 10 mg (*)] 5 mg PO DAILY 10/05/18


 


Potassium Chloride 20 meq PO BID 10/05/18


 


Acetaminophen [Tylenol  mg 1,000 mg PO Q8HRS  tab 10/08/18





(*)]  


 


Lidocaine 4%/Menthol 1% [Icy Hot 1 patch TD DAILY  patch 10/08/18





Lidocaine/Menthol 4%/1% Patch (*)]  


 


Methocarbamol [Robaxin 750 mg (*)] 750 mg PO TID PRN  tab 10/08/18


 


Patch Removal 1 ea TD DAILY21  patch 10/08/18


 


traMADol [Ultram 50 mg (*)] 50 mg PO Q6HRS PRN  tab 10/08/18


 


Cephalexin [Keflex] 500 mg PO Q6H #28 cap 04/21/19














Medical Decision Making


Differential Diagnosis: 





79-year-old male coming in from home diagnosed earlier today with UTI with 

urinary retention.  Bates catheter placed, initially functioning, now there is 

decreased urine output.  Bladder scan demonstrated about 800 mL of urine.  

Nurse was able to manipulate the catheter and with this had return of pinkish 

colored urine without any clots.  It appears that the catheter may have 

migrated out of the bladder and into the urethra and was not draining properly 

because of this.  Patient is happy to be discharged home, currently on 

antibiotics for UTI.





Departure





- Departure


Disposition: Home, Routine, Self-Care


Clinical Impression: 


Bates catheter problem


Qualifiers:


 Encounter type: initial encounter Qualified Code(s): T83.9XXA - Unspecified 

complication of genitourinary prosthetic device, implant and graft, initial 

encounter





Urinary tract infection


Qualifiers:


 Urinary tract infection type: site unspecified Hematuria presence: without 

hematuria Qualified Code(s): N39.0 - Urinary tract infection, site not specified





Condition: Good


Instructions:  Bates Catheter Placement and Care (ED)


Additional Instructions: 


Please return to the emergency department if your worse in any way.


Referrals: 


Yury Mas MD [Primary Care Provider] - As per Instructions

## 2019-06-13 ENCOUNTER — HOSPITAL ENCOUNTER (INPATIENT)
Dept: HOSPITAL 80 - F1N | Age: 79
LOS: 2 days | Discharge: HOME | End: 2019-06-15
Payer: COMMERCIAL

## 2019-06-17 ENCOUNTER — HOSPITAL ENCOUNTER (INPATIENT)
Dept: HOSPITAL 80 - FED | Age: 79
LOS: 8 days | Discharge: NURSING, REHAB OR LONG-TERM CARE | End: 2019-06-25
Payer: COMMERCIAL

## 2020-11-10 NOTE — HOSPPROG
Hospitalist Progress Note


Assessment/Plan: 








Virgil is an 77 y/o man who was recently her on October 3rd w spinal stenosis 

and neuropathic symptoms.  He was discharged on October 4th. He returned to the 

hospital due to some confusion and difficulty w standing.  





* mild decline in cognition 


-has been on Valium and narcotics likely causing this


-avoid Benadryl in the elderly


-reviewed his labs; no s/sx of infection


-reviewed his care with his wife and w the patient, he has been taking oxy IR 

for past 6 months regularly


-he is clear and oriented this morning, but a bit more agitated, says his 

stomach is "gloomy"





*back pain secondary to stenosis


-will add Lidoderm patch


-scheduled Tylenol


-no c/o back pain today





*weakness


-PT is recommending SNF





* right-sided congestive heart failure, chronic 


-stable, resumed his home dose of oral Lasix





* left heart disease with coronary stents, no evidence of left-sided failure or 

ischemia at present





* history of AFib currently sinus with paced ventricle


-Eliquis





*plan: met w his wife this morning, he takes oxy frequently and he is at high 

risk for withdrawing. He's describing an upset stomach and is cranky/ will 

order prn oxy with the intent of weaning off. He is not having back pain. Wife 

would really like him to return home. Will await and see if he improves today w 

PT and OT.  Reviewed his care w Emily, neurosurgery PA.  


Subjective: Virgil said his stomach is bothering him.


Objective: 


 Vital Signs











Temp Pulse Resp BP Pulse Ox


 


 36.9 C   70   16   118/79   97 


 


 10/07/18 08:00  10/07/18 08:00  10/07/18 08:00  10/07/18 08:00  10/07/18 08:00








 











 10/06/18 10/07/18 10/08/18





 05:59 05:59 05:59


 


Intake Total 900 1050 


 


Output Total 250 1300 


 


Balance 650 -250 








 











PT  13.6 SEC (12.0-15.0)   10/05/18  15:50    


 


INR  1.02  (0.83-1.16)   10/05/18  15:50    














- Physical Exam


Constitutional: not in pain, uncomfortable


Eyes: PERRL


Ears, Nose, Mouth, Throat: hearing normal


Cardiovascular: regular rate and rhythym


Respiratory: no respiratory distress


Gastrointestinal: normoactive bowel sounds, other (round and large)


Genitourinary: no renal bruits


Musculoskeletal: generalized weakness


Neurologic: AAOx3


Psychiatric: interacting appropriately





ICD10 Worksheet


Patient Problems: 


 Problems











Problem Status Onset


 


S/P lumbar laminectomy Acute Patient returned call she does have [employer] insurance but did not present it at her visit. She is currently driving and will call back with her insurance information today when she can.     Patient is also asking for some type of muscle relaxer to help with her Fibromyalgia. Please advise.

## 2022-07-25 NOTE — PDPROPOC
Sedation Plan of Care


Sedation Plan of Care: vital signs stable, mental status noted, patient 

educated of risks, benefits, alternatives, patient can tolerate sedation


ASA Classification: ASA 2


Planned drugs: fentanyl, midazolam


Mallampati Score: Class 2


Mallampati Reference Image: 





Patient passed 3-3-2 rule?: Yes 25-Jul-2022 07:03